# Patient Record
Sex: FEMALE | Race: WHITE | NOT HISPANIC OR LATINO | Employment: OTHER | URBAN - NONMETROPOLITAN AREA
[De-identification: names, ages, dates, MRNs, and addresses within clinical notes are randomized per-mention and may not be internally consistent; named-entity substitution may affect disease eponyms.]

---

## 2019-07-02 ENCOUNTER — OFFICE VISIT (OUTPATIENT)
Dept: URGENT CARE | Facility: PHYSICIAN GROUP | Age: 69
End: 2019-07-02
Payer: MEDICARE

## 2019-07-02 VITALS
HEART RATE: 96 BPM | TEMPERATURE: 97.9 F | SYSTOLIC BLOOD PRESSURE: 144 MMHG | RESPIRATION RATE: 16 BRPM | BODY MASS INDEX: 27.88 KG/M2 | WEIGHT: 142 LBS | HEIGHT: 60 IN | DIASTOLIC BLOOD PRESSURE: 82 MMHG | OXYGEN SATURATION: 96 %

## 2019-07-02 DIAGNOSIS — L03.012 CELLULITIS OF FINGER OF LEFT HAND: ICD-10-CM

## 2019-07-02 PROCEDURE — 99204 OFFICE O/P NEW MOD 45 MIN: CPT | Performed by: PHYSICIAN ASSISTANT

## 2019-07-02 RX ORDER — SULFAMETHOXAZOLE AND TRIMETHOPRIM 800; 160 MG/1; MG/1
1 TABLET ORAL 2 TIMES DAILY
Qty: 20 TAB | Refills: 0 | Status: SHIPPED | OUTPATIENT
Start: 2019-07-02 | End: 2019-07-12

## 2019-07-02 RX ORDER — DICLOFENAC SODIUM 75 MG/1
75 TABLET, DELAYED RELEASE ORAL 2 TIMES DAILY
Qty: 30 TAB | Refills: 0 | Status: SHIPPED | OUTPATIENT
Start: 2019-07-02

## 2019-07-03 NOTE — PROGRESS NOTES
Chief Complaint   Patient presents with   • Spider Bite       HISTORY OF PRESENT ILLNESS: Patient is a 68 y.o. female who presents today because she woke up this morning with her left ring finger swollen and red and tender.  She does remember gardening in her yard yesterday and not wearing gloves.  She does not remember getting bit or stung by an insect or spider but thinks that is what caused it.    Patient Active Problem List    Diagnosis Date Noted   • Hiatal hernia 07/30/2016   • Chest pain 07/30/2016   • Tobacco abuse 07/29/2016   • Depression 07/29/2016   • COPD (chronic obstructive pulmonary disease) ([facility]) 07/29/2016   • Chronic back pain 07/29/2016   • Rash 01/14/2013   • Cellulitis 01/14/2013       Allergies:No known drug allergy    Current Outpatient Prescriptions Ordered in [facility]   Medication Sig Dispense Refill   • diclofenac EC (VOLTAREN) 75 MG Tablet Delayed Response Take 1 Tab by mouth 2 times a day. 30 Tab 0   • sulfamethoxazole-trimethoprim (BACTRIM DS) 800-160 MG tablet Take 1 Tab by mouth 2 times a day for 10 days. 20 Tab 0   • atorvastatin (LIPITOR) 40 MG Tab Take 0.5 Tabs by mouth every bedtime. 30 Tab 2   • omeprazole (PRILOSEC) 20 MG delayed-release capsule Take 1 Cap by mouth every 12 hours. 30 Cap 11   • duloxetine (CYMBALTA) 60 MG Cap DR Particles delayed-release capsule Take 60 mg by mouth every day.     • gabapentin (NEURONTIN) 600 MG tablet Take 800 mg by mouth 3 times a day.     • ipratropium-albuterol (DUONEB) 0.5-2.5 (3) MG/3ML nebulizer solution 3 mL by Nebulization route 4 times a day.     • alprazolam (XANAX) 0.5 MG TABS Take 0.5 mg by mouth at bedtime as needed.     • aspirin (ASA) 325 MG TABS Take 325 mg by mouth Once.       No current Epic-ordered facility-administered medications on file.        Past Medical History:   Diagnosis Date   • Bell's palsy    • COPD    • Infectious disease     Hepatitis C   • Psychiatric problem     depression       Social History   Substance Use  Topics   • Smoking status: Current Every Day Smoker     Packs/day: 0.50     Types: Cigarettes   • Smokeless tobacco: Never Used   • Alcohol use No       No family status information on file.   No family history on file.    ROS:  Review of Systems   Constitutional: Negative for fever, chills, weight loss and malaise/fatigue.   HENT: Negative for ear pain, nosebleeds, congestion, sore throat and neck pain.    Eyes: Negative for blurred vision.   Respiratory: Negative for cough, sputum production, shortness of breath and wheezing.    Cardiovascular: Negative for chest pain, palpitations, orthopnea and leg swelling.   Gastrointestinal: Negative for heartburn, nausea, vomiting and abdominal pain.   Genitourinary: Negative for dysuria, urgency and frequency.     Exam:  /82 (BP Location: Right arm, Patient Position: Sitting, BP Cuff Size: Adult)   Pulse 96   Temp 36.6 °C (97.9 °F) (Temporal)   Resp 16   Ht 1.524 m (5')   Wt 64.4 kg (142 lb)   SpO2 96%   General:  Well nourished, well developed female in NAD  Head:Normocephalic, atraumatic  Eyes: PERRLA, EOM within normal limits, no conjunctival injection, no scleral icterus, visual fields and acuity grossly intact.  Nose: Symmetrical without tenderness, no discharge.  Mouth: reasonable hygiene, no erythema exudates or tonsillar enlargement.  Neck: no masses, range of motion within normal limits, no tracheal deviation. No obvious thyroid enlargement.  Extremities: no clubbing, cyanosis, or edema.  Left index finger is circumferentially swollen and erythematous and tender.  She has reduced range of motion secondary to pain.  No visible puncture wounds    Please note that this dictation was created using voice recognition software. I have made every reasonable attempt to correct obvious errors, but I expect that there are errors of grammar and possibly content that I did not discover before finalizing the note.    Assessment/Plan:  1. Cellulitis of finger of left  hand  diclofenac EC (VOLTAREN) 75 MG Tablet Delayed Response    sulfamethoxazole-trimethoprim (BACTRIM DS) 800-160 MG tablet       Followup with primary care in the next 7-10 days if not significantly improving, return to the urgent care or go to the emergency room sooner for any worsening of symptoms.

## 2021-07-01 PROBLEM — R52 INTRACTABLE PAIN: Status: ACTIVE | Noted: 2021-01-01

## 2021-07-01 PROBLEM — M75.122 NONTRAUMATIC COMPLETE TEAR OF LEFT ROTATOR CUFF: Status: ACTIVE | Noted: 2021-01-01

## 2021-07-01 PROBLEM — M50.30 OTHER CERVICAL DISC DEGENERATION, UNSPECIFIED CERVICAL REGION: Status: ACTIVE | Noted: 2021-01-01

## 2021-07-01 PROBLEM — M47.816 LUMBAR SPONDYLOSIS: Status: ACTIVE | Noted: 2021-01-01

## 2021-07-01 PROBLEM — F11.90 OPIOID USE: Status: ACTIVE | Noted: 2021-01-01

## 2021-07-01 PROBLEM — M79.18 CERVICAL MYOFASCIAL PAIN SYNDROME: Status: ACTIVE | Noted: 2021-01-01

## 2021-07-01 PROBLEM — M51.36 OTHER INTERVERTEBRAL DISC DEGENERATION, LUMBAR REGION: Status: ACTIVE | Noted: 2021-01-01

## 2022-01-01 ENCOUNTER — APPOINTMENT (OUTPATIENT)
Dept: URGENT CARE | Facility: PHYSICIAN GROUP | Age: 72
End: 2022-01-01
Payer: MEDICARE

## 2022-01-01 ENCOUNTER — HOSPITAL ENCOUNTER (OUTPATIENT)
Dept: RADIOLOGY | Facility: MEDICAL CENTER | Age: 72
End: 2022-06-29

## 2022-01-01 ENCOUNTER — APPOINTMENT (OUTPATIENT)
Dept: CARDIOLOGY | Facility: MEDICAL CENTER | Age: 72
DRG: 871 | End: 2022-01-01
Attending: INTERNAL MEDICINE
Payer: MEDICARE

## 2022-01-01 ENCOUNTER — APPOINTMENT (OUTPATIENT)
Dept: RADIOLOGY | Facility: MEDICAL CENTER | Age: 72
DRG: 871 | End: 2022-01-01
Attending: PSYCHIATRY & NEUROLOGY
Payer: MEDICARE

## 2022-01-01 ENCOUNTER — APPOINTMENT (OUTPATIENT)
Dept: RADIOLOGY | Facility: MEDICAL CENTER | Age: 72
DRG: 871 | End: 2022-01-01
Attending: INTERNAL MEDICINE
Payer: MEDICARE

## 2022-01-01 ENCOUNTER — HOSPITAL ENCOUNTER (INPATIENT)
Facility: MEDICAL CENTER | Age: 72
LOS: 3 days | DRG: 871 | End: 2022-07-01
Attending: INTERNAL MEDICINE | Admitting: INTERNAL MEDICINE
Payer: MEDICARE

## 2022-01-01 VITALS
TEMPERATURE: 98.2 F | RESPIRATION RATE: 20 BRPM | DIASTOLIC BLOOD PRESSURE: 51 MMHG | HEART RATE: 75 BPM | OXYGEN SATURATION: 81 % | HEIGHT: 61 IN | SYSTOLIC BLOOD PRESSURE: 93 MMHG | WEIGHT: 121.25 LBS | BODY MASS INDEX: 22.89 KG/M2

## 2022-01-01 LAB
ABO GROUP BLD: NORMAL
ALBUMIN SERPL BCP-MCNC: 2.1 G/DL (ref 3.2–4.9)
ALBUMIN SERPL BCP-MCNC: 2.4 G/DL (ref 3.2–4.9)
ALBUMIN SERPL BCP-MCNC: 2.6 G/DL (ref 3.2–4.9)
ALBUMIN SERPL BCP-MCNC: 2.6 G/DL (ref 3.2–4.9)
ALBUMIN/GLOB SERPL: 0.9 G/DL
ALBUMIN/GLOB SERPL: 1.1 G/DL
ALBUMIN/GLOB SERPL: 1.1 G/DL
ALBUMIN/GLOB SERPL: 1.2 G/DL
ALP SERPL-CCNC: 116 U/L (ref 30–99)
ALP SERPL-CCNC: 41 U/L (ref 30–99)
ALP SERPL-CCNC: 52 U/L (ref 30–99)
ALP SERPL-CCNC: 62 U/L (ref 30–99)
ALT SERPL-CCNC: 2424 U/L (ref 2–50)
ALT SERPL-CCNC: 3051 U/L (ref 2–50)
ALT SERPL-CCNC: 46 U/L (ref 2–50)
ALT SERPL-CCNC: 705 U/L (ref 2–50)
AMMONIA PLAS-SCNC: 55 UMOL/L (ref 11–45)
AMPHET UR QL SCN: NEGATIVE
ANION GAP SERPL CALC-SCNC: 12 MMOL/L (ref 7–16)
ANION GAP SERPL CALC-SCNC: 17 MMOL/L (ref 7–16)
ANION GAP SERPL CALC-SCNC: 18 MMOL/L (ref 7–16)
ANION GAP SERPL CALC-SCNC: 20 MMOL/L (ref 7–16)
ANION GAP SERPL CALC-SCNC: 21 MMOL/L (ref 7–16)
ANISOCYTOSIS BLD QL SMEAR: ABNORMAL
APTT PPP: 38.6 SEC (ref 24.7–36)
APTT PPP: 43.6 SEC (ref 24.7–36)
AST SERPL-CCNC: 161 U/L (ref 12–45)
AST SERPL-CCNC: 3462 U/L (ref 12–45)
AST SERPL-CCNC: 4392 U/L (ref 12–45)
AST SERPL-CCNC: 883 U/L (ref 12–45)
BACTERIA BLD CULT: ABNORMAL
BACTERIA CSF CULT: NORMAL
BARBITURATES UR QL SCN: NEGATIVE
BARCODED ABORH UBTYP: 6200
BARCODED PRD CODE UBPRD: NORMAL
BARCODED UNIT NUM UBUNT: NORMAL
BASE EXCESS BLDA CALC-SCNC: -1 MMOL/L (ref -4–3)
BASE EXCESS BLDA CALC-SCNC: -10 MMOL/L (ref -4–3)
BASE EXCESS BLDA CALC-SCNC: -6 MMOL/L (ref -4–3)
BASE EXCESS BLDA CALC-SCNC: -6 MMOL/L (ref -4–3)
BASOPHILS # BLD AUTO: 0 % (ref 0–1.8)
BASOPHILS # BLD: 0 K/UL (ref 0–0.12)
BENZODIAZ UR QL SCN: POSITIVE
BILIRUB SERPL-MCNC: 0.7 MG/DL (ref 0.1–1.5)
BILIRUB SERPL-MCNC: 0.8 MG/DL (ref 0.1–1.5)
BILIRUB SERPL-MCNC: 0.9 MG/DL (ref 0.1–1.5)
BILIRUB SERPL-MCNC: 1 MG/DL (ref 0.1–1.5)
BLOOD CULTURE HOLD CXBCH: NORMAL
BLOOD CULTURE HOLD CXBCH: NORMAL
BODY TEMPERATURE: ABNORMAL DEGREES
BREATHS SETTING VENT: 18
BREATHS SETTING VENT: 18
BREATHS SETTING VENT: 20
BREATHS SETTING VENT: 20
BUN SERPL-MCNC: 31 MG/DL (ref 8–22)
BUN SERPL-MCNC: 38 MG/DL (ref 8–22)
BUN SERPL-MCNC: 43 MG/DL (ref 8–22)
BUN SERPL-MCNC: 49 MG/DL (ref 8–22)
BUN SERPL-MCNC: 53 MG/DL (ref 8–22)
BURR CELLS BLD QL SMEAR: NORMAL
BURR CELLS/RBC NFR CSF MANUAL: 0 %
BZE UR QL SCN: NEGATIVE
C GATTII+NEOFOR DNA CSF QL NAA+NON-PROBE: NOT DETECTED
CA-I SERPL-SCNC: 0.9 MMOL/L (ref 1.1–1.3)
CALCIUM SERPL-MCNC: 6.8 MG/DL (ref 8.5–10.5)
CALCIUM SERPL-MCNC: 6.8 MG/DL (ref 8.5–10.5)
CALCIUM SERPL-MCNC: 7.2 MG/DL (ref 8.5–10.5)
CALCIUM SERPL-MCNC: 7.4 MG/DL (ref 8.5–10.5)
CALCIUM SERPL-MCNC: 7.6 MG/DL (ref 8.5–10.5)
CANNABINOIDS UR QL SCN: POSITIVE
CFT BLD TEG: 14.7 MIN (ref 4.6–9.1)
CFT P HPASE BLD TEG: 10 MIN (ref 4.3–8.3)
CHLORIDE SERPL-SCNC: 102 MMOL/L (ref 96–112)
CHLORIDE SERPL-SCNC: 103 MMOL/L (ref 96–112)
CHLORIDE SERPL-SCNC: 106 MMOL/L (ref 96–112)
CHLORIDE SERPL-SCNC: 107 MMOL/L (ref 96–112)
CHLORIDE SERPL-SCNC: 107 MMOL/L (ref 96–112)
CK SERPL-CCNC: 2510 U/L (ref 0–154)
CK SERPL-CCNC: 2890 U/L (ref 0–154)
CK SERPL-CCNC: 3083 U/L (ref 0–154)
CK SERPL-CCNC: 3377 U/L (ref 0–154)
CK SERPL-CCNC: 3449 U/L (ref 0–154)
CK SERPL-CCNC: 4548 U/L (ref 0–154)
CLARITY CSF: CLEAR
CLOT ANGLE BLD TEG: 64.4 DEGREES (ref 63–78)
CLOT LYSIS 30M P MA LENFR BLD TEG: 0.8 % (ref 0–2.6)
CMV DNA CSF QL NAA+NON-PROBE: NOT DETECTED
CO2 BLDA-SCNC: 16 MMOL/L (ref 20–33)
CO2 BLDA-SCNC: 20 MMOL/L (ref 20–33)
CO2 BLDA-SCNC: 20 MMOL/L (ref 20–33)
CO2 BLDA-SCNC: 26 MMOL/L (ref 20–33)
CO2 SERPL-SCNC: 15 MMOL/L (ref 20–33)
CO2 SERPL-SCNC: 17 MMOL/L (ref 20–33)
CO2 SERPL-SCNC: 17 MMOL/L (ref 20–33)
CO2 SERPL-SCNC: 18 MMOL/L (ref 20–33)
CO2 SERPL-SCNC: 20 MMOL/L (ref 20–33)
COLOR CSF: COLORLESS
COLOR SPUN CSF: COLORLESS
COMPONENT P 8504P: NORMAL
CORTIS SERPL-MCNC: 28.3 UG/DL (ref 0–23)
CREAT SERPL-MCNC: 1.11 MG/DL (ref 0.5–1.4)
CREAT SERPL-MCNC: 1.95 MG/DL (ref 0.5–1.4)
CREAT SERPL-MCNC: 2.17 MG/DL (ref 0.5–1.4)
CREAT SERPL-MCNC: 2.76 MG/DL (ref 0.5–1.4)
CREAT SERPL-MCNC: 2.84 MG/DL (ref 0.5–1.4)
CT.EXTRINSIC BLD ROTEM: 2.3 MIN (ref 0.8–2.1)
DELSYS IDSYS: ABNORMAL
DOHLE BOD BLD QL SMEAR: NORMAL
E COLI K1 DNA CSF QL NAA+NON-PROBE: NOT DETECTED
EKG IMPRESSION: NORMAL
EKG IMPRESSION: NORMAL
END TIDAL CARBON DIOXIDE IECO2: 26 MMHG
END TIDAL CARBON DIOXIDE IECO2: 27 MMHG
END TIDAL CARBON DIOXIDE IECO2: 29 MMHG
EOSINOPHIL # BLD AUTO: 0 K/UL (ref 0–0.51)
EOSINOPHIL NFR BLD: 0 % (ref 0–6.9)
ERYTHROCYTE [DISTWIDTH] IN BLOOD BY AUTOMATED COUNT: 51 FL (ref 35.9–50)
ERYTHROCYTE [DISTWIDTH] IN BLOOD BY AUTOMATED COUNT: 52.6 FL (ref 35.9–50)
ERYTHROCYTE [DISTWIDTH] IN BLOOD BY AUTOMATED COUNT: 53 FL (ref 35.9–50)
ERYTHROCYTE [DISTWIDTH] IN BLOOD BY AUTOMATED COUNT: 55 FL (ref 35.9–50)
EV RNA CSF QL NAA+NON-PROBE: NOT DETECTED
FIBRINOGEN PPP-MCNC: 325 MG/DL (ref 215–460)
FIBRINOGEN PPP-MCNC: 336 MG/DL (ref 215–460)
FIBRINOGEN PPP-MCNC: 390 MG/DL (ref 215–460)
FLUAV RNA SPEC QL NAA+PROBE: NEGATIVE
FLUBV RNA SPEC QL NAA+PROBE: NEGATIVE
GFR SERPLBLD CREATININE-BSD FMLA CKD-EPI: 17 ML/MIN/1.73 M 2
GFR SERPLBLD CREATININE-BSD FMLA CKD-EPI: 18 ML/MIN/1.73 M 2
GFR SERPLBLD CREATININE-BSD FMLA CKD-EPI: 24 ML/MIN/1.73 M 2
GFR SERPLBLD CREATININE-BSD FMLA CKD-EPI: 27 ML/MIN/1.73 M 2
GFR SERPLBLD CREATININE-BSD FMLA CKD-EPI: 53 ML/MIN/1.73 M 2
GLOBULIN SER CALC-MCNC: 2.2 G/DL (ref 1.9–3.5)
GLOBULIN SER CALC-MCNC: 2.2 G/DL (ref 1.9–3.5)
GLOBULIN SER CALC-MCNC: 2.3 G/DL (ref 1.9–3.5)
GLOBULIN SER CALC-MCNC: 2.4 G/DL (ref 1.9–3.5)
GLUCOSE CSF-MCNC: 68 MG/DL (ref 40–80)
GLUCOSE SERPL-MCNC: 117 MG/DL (ref 65–99)
GLUCOSE SERPL-MCNC: 132 MG/DL (ref 65–99)
GLUCOSE SERPL-MCNC: 164 MG/DL (ref 65–99)
GLUCOSE SERPL-MCNC: 177 MG/DL (ref 65–99)
GLUCOSE SERPL-MCNC: 80 MG/DL (ref 65–99)
GP B STREP DNA CSF QL NAA+NON-PROBE: NOT DETECTED
GRAM STN SPEC: NORMAL
GRAM STN SPEC: NORMAL
HAEM INFLU DNA CSF QL NAA+NON-PROBE: NOT DETECTED
HAV IGM SERPL QL IA: ABNORMAL
HBV CORE IGM SER QL: ABNORMAL
HBV SURFACE AG SER QL: ABNORMAL
HCO3 BLDA-SCNC: 14.9 MMOL/L (ref 17–25)
HCO3 BLDA-SCNC: 18.7 MMOL/L (ref 17–25)
HCO3 BLDA-SCNC: 19 MMOL/L (ref 17–25)
HCO3 BLDA-SCNC: 24.4 MMOL/L (ref 17–25)
HCT VFR BLD AUTO: 32.4 % (ref 37–47)
HCT VFR BLD AUTO: 33.9 % (ref 37–47)
HCT VFR BLD AUTO: 34.6 % (ref 37–47)
HCT VFR BLD AUTO: 39.3 % (ref 37–47)
HCV AB SER QL: REACTIVE
HCV RNA SERPL NAA+PROBE-ACNC: NOT DETECTED IU/ML
HCV RNA SERPL NAA+PROBE-LOG IU: NOT DETECTED LOG IU/ML
HCV RNA SERPL QL NAA+PROBE: NOT DETECTED
HGB BLD-MCNC: 10.8 G/DL (ref 12–16)
HGB BLD-MCNC: 11.4 G/DL (ref 12–16)
HGB BLD-MCNC: 11.7 G/DL (ref 12–16)
HGB BLD-MCNC: 12.6 G/DL (ref 12–16)
HHV6 DNA CSF QL NAA+NON-PROBE: NOT DETECTED
HIV 1+2 AB+HIV1 P24 AG SERPL QL IA: NORMAL
HOROWITZ INDEX BLDA+IHG-RTO: 257 MM[HG]
HOROWITZ INDEX BLDA+IHG-RTO: 317 MM[HG]
HOROWITZ INDEX BLDA+IHG-RTO: 343 MM[HG]
HOROWITZ INDEX BLDA+IHG-RTO: 360 MM[HG]
HSV DNA SPEC QL NAA+PROBE: NOT DETECTED
HSV1 DNA CSF QL NAA+NON-PROBE: NOT DETECTED
HSV2 DNA CSF QL NAA+NON-PROBE: NOT DETECTED
INR PPP: 1.7 (ref 0.87–1.13)
INR PPP: 2.01 (ref 0.87–1.13)
INR PPP: 2.49 (ref 0.87–1.13)
L MONOCYTOG DNA CSF QL NAA+NON-PROBE: NOT DETECTED
LACTATE BLD-SCNC: 2.2 MMOL/L (ref 0.5–2)
LACTATE BLD-SCNC: 4.2 MMOL/L (ref 0.5–2)
LACTATE BLD-SCNC: 4.9 MMOL/L (ref 0.5–2)
LACTATE BLD-SCNC: 6.2 MMOL/L (ref 0.5–2)
LACTATE BLD-SCNC: 6.3 MMOL/L (ref 0.5–2)
LACTATE BLD-SCNC: 7 MMOL/L (ref 0.5–2)
LACTATE BLD-SCNC: 7.2 MMOL/L (ref 0.5–2)
LACTATE SERPL-SCNC: 4.5 MMOL/L (ref 0.5–2)
LG PLATELETS BLD QL SMEAR: NORMAL
LV EJECT FRACT  99904: 35
LV EJECT FRACT MOD 2C 99903: 36.92
LV EJECT FRACT MOD 4C 99902: 30.79
LV EJECT FRACT MOD BP 99901: 35.43
LYMPHOCYTES # BLD AUTO: 0.12 K/UL (ref 1–4.8)
LYMPHOCYTES # BLD AUTO: 0.25 K/UL (ref 1–4.8)
LYMPHOCYTES # BLD AUTO: 0.62 K/UL (ref 1–4.8)
LYMPHOCYTES # BLD AUTO: 0.76 K/UL (ref 1–4.8)
LYMPHOCYTES NFR BLD: 1.7 % (ref 22–41)
LYMPHOCYTES NFR BLD: 11.2 % (ref 22–41)
LYMPHOCYTES NFR BLD: 11.3 % (ref 22–41)
LYMPHOCYTES NFR BLD: 5.3 % (ref 22–41)
LYMPHOCYTES NFR CSF: 1 %
MACROCYTES BLD QL SMEAR: ABNORMAL
MAGNESIUM SERPL-MCNC: 1.5 MG/DL (ref 1.5–2.5)
MAGNESIUM SERPL-MCNC: 2.3 MG/DL (ref 1.5–2.5)
MAGNESIUM SERPL-MCNC: 2.8 MG/DL (ref 1.5–2.5)
MANUAL DIFF BLD: ABNORMAL
MANUAL DIFF BLD: NORMAL
MCF BLD TEG: 51.9 MM (ref 52–69)
MCF.PLATELET INHIB BLD ROTEM: 19.6 MM (ref 15–32)
MCH RBC QN AUTO: 30.8 PG (ref 27–33)
MCH RBC QN AUTO: 31.1 PG (ref 27–33)
MCH RBC QN AUTO: 31.3 PG (ref 27–33)
MCH RBC QN AUTO: 31.5 PG (ref 27–33)
MCHC RBC AUTO-ENTMCNC: 32.1 G/DL (ref 33.6–35)
MCHC RBC AUTO-ENTMCNC: 33.3 G/DL (ref 33.6–35)
MCHC RBC AUTO-ENTMCNC: 33.6 G/DL (ref 33.6–35)
MCHC RBC AUTO-ENTMCNC: 33.8 G/DL (ref 33.6–35)
MCV RBC AUTO: 91.6 FL (ref 81.4–97.8)
MCV RBC AUTO: 93 FL (ref 81.4–97.8)
MCV RBC AUTO: 93.4 FL (ref 81.4–97.8)
MCV RBC AUTO: 97.8 FL (ref 81.4–97.8)
METAMYELOCYTES NFR BLD MANUAL: 0.9 %
METAMYELOCYTES NFR BLD MANUAL: 10.5 %
METAMYELOCYTES NFR BLD MANUAL: 6.1 %
METHADONE UR QL SCN: NEGATIVE
MODE IMODE: ABNORMAL
MONOCYTES # BLD AUTO: 0.04 K/UL (ref 0–0.85)
MONOCYTES # BLD AUTO: 0.05 K/UL (ref 0–0.85)
MONOCYTES # BLD AUTO: 0.12 K/UL (ref 0–0.85)
MONOCYTES # BLD AUTO: 0.41 K/UL (ref 0–0.85)
MONOCYTES NFR BLD AUTO: 0.9 % (ref 0–13.4)
MONOCYTES NFR BLD AUTO: 0.9 % (ref 0–13.4)
MONOCYTES NFR BLD AUTO: 1.7 % (ref 0–13.4)
MONOCYTES NFR BLD AUTO: 6 % (ref 0–13.4)
MONONUC CELLS NFR CSF: 3 %
MORPHOLOGY BLD-IMP: NORMAL
MYELOCYTES NFR BLD MANUAL: 0.9 %
MYELOCYTES NFR BLD MANUAL: 0.9 %
MYELOCYTES NFR BLD MANUAL: 1.8 %
N MEN DNA CSF QL NAA+NON-PROBE: NOT DETECTED
NEUTROPHILS # BLD AUTO: 3.83 K/UL (ref 2–7.15)
NEUTROPHILS # BLD AUTO: 4.83 K/UL (ref 2–7.15)
NEUTROPHILS # BLD AUTO: 5.51 K/UL (ref 2–7.15)
NEUTROPHILS # BLD AUTO: 6.54 K/UL (ref 2–7.15)
NEUTROPHILS NFR BLD: 59.6 % (ref 44–72)
NEUTROPHILS NFR BLD: 64.3 % (ref 44–72)
NEUTROPHILS NFR BLD: 68.7 % (ref 44–72)
NEUTROPHILS NFR BLD: 74.1 % (ref 44–72)
NEUTROPHILS NFR CSF: 96 %
NEUTS BAND NFR BLD MANUAL: 20.9 % (ref 0–10)
NEUTS BAND NFR BLD MANUAL: 21.9 % (ref 0–10)
NEUTS BAND NFR BLD MANUAL: 23.5 % (ref 0–10)
NEUTS BAND NFR BLD MANUAL: 6.9 % (ref 0–10)
NRBC # BLD AUTO: 0 K/UL
NRBC BLD-RTO: 0 /100 WBC
O2/TOTAL GAS SETTING VFR VENT: 30 %
O2/TOTAL GAS SETTING VFR VENT: 60 %
OPIATES UR QL SCN: NEGATIVE
OVALOCYTES BLD QL SMEAR: NORMAL
OXYCODONE UR QL SCN: NEGATIVE
PA AA BLD-ACNC: 0 % (ref 0–11)
PA ADP BLD-ACNC: 11.3 % (ref 0–17)
PARECHOVIRUS A RNA CSF QL NAA+NON-PROBE: NOT DETECTED
PCO2 BLDA: 27.8 MMHG (ref 26–37)
PCO2 BLDA: 33 MMHG (ref 26–37)
PCO2 BLDA: 33.5 MMHG (ref 26–37)
PCO2 BLDA: 42.7 MMHG (ref 26–37)
PCO2 TEMP ADJ BLDA: 30.2 MMHG (ref 26–37)
PCO2 TEMP ADJ BLDA: 32.3 MMHG (ref 26–37)
PCO2 TEMP ADJ BLDA: 33.4 MMHG (ref 26–37)
PCO2 TEMP ADJ BLDA: 41.6 MMHG (ref 26–37)
PCP UR QL SCN: NEGATIVE
PEEP END EXPIRATORY PRESSURE IPEEP: 8 CMH20
PH BLDA: 7.34 [PH] (ref 7.4–7.5)
PH BLDA: 7.36 [PH] (ref 7.4–7.5)
PH BLDA: 7.36 [PH] (ref 7.4–7.5)
PH BLDA: 7.37 [PH] (ref 7.4–7.5)
PH TEMP ADJ BLDA: 7.31 [PH] (ref 7.4–7.5)
PH TEMP ADJ BLDA: 7.36 [PH] (ref 7.4–7.5)
PH TEMP ADJ BLDA: 7.37 [PH] (ref 7.4–7.5)
PH TEMP ADJ BLDA: 7.38 [PH] (ref 7.4–7.5)
PHOSPHATE SERPL-MCNC: 4.8 MG/DL (ref 2.5–4.5)
PHOSPHATE SERPL-MCNC: 5.7 MG/DL (ref 2.5–4.5)
PHOSPHATE SERPL-MCNC: 5.7 MG/DL (ref 2.5–4.5)
PLATELET # BLD AUTO: 59 K/UL (ref 164–446)
PLATELET # BLD AUTO: 74 K/UL (ref 164–446)
PLATELET # BLD AUTO: 80 K/UL (ref 164–446)
PLATELET # BLD AUTO: 95 K/UL (ref 164–446)
PLATELET BLD QL SMEAR: NORMAL
PMV BLD AUTO: 12.1 FL (ref 9–12.9)
PMV BLD AUTO: 12.9 FL (ref 9–12.9)
PMV BLD AUTO: 13 FL (ref 9–12.9)
PMV BLD AUTO: 13.2 FL (ref 9–12.9)
PO2 BLDA: 103 MMHG (ref 64–87)
PO2 BLDA: 216 MMHG (ref 64–87)
PO2 BLDA: 77 MMHG (ref 64–87)
PO2 BLDA: 95 MMHG (ref 64–87)
PO2 TEMP ADJ BLDA: 100 MMHG (ref 64–87)
PO2 TEMP ADJ BLDA: 106 MMHG (ref 64–87)
PO2 TEMP ADJ BLDA: 213 MMHG (ref 64–87)
PO2 TEMP ADJ BLDA: 76 MMHG (ref 64–87)
POIKILOCYTOSIS BLD QL SMEAR: NORMAL
POTASSIUM SERPL-SCNC: 3.1 MMOL/L (ref 3.6–5.5)
POTASSIUM SERPL-SCNC: 4.4 MMOL/L (ref 3.6–5.5)
POTASSIUM SERPL-SCNC: 4.5 MMOL/L (ref 3.6–5.5)
POTASSIUM SERPL-SCNC: 4.6 MMOL/L (ref 3.6–5.5)
POTASSIUM SERPL-SCNC: 5.2 MMOL/L (ref 3.6–5.5)
PRODUCT TYPE UPROD: NORMAL
PROPOXYPH UR QL SCN: NEGATIVE
PROT CSF-MCNC: 59 MG/DL (ref 15–45)
PROT SERPL-MCNC: 4.5 G/DL (ref 6–8.2)
PROT SERPL-MCNC: 4.6 G/DL (ref 6–8.2)
PROT SERPL-MCNC: 4.8 G/DL (ref 6–8.2)
PROT SERPL-MCNC: 4.9 G/DL (ref 6–8.2)
PROTHROMBIN TIME: 19.5 SEC (ref 12–14.6)
PROTHROMBIN TIME: 22.2 SEC (ref 12–14.6)
PROTHROMBIN TIME: 26.2 SEC (ref 12–14.6)
RBC # BLD AUTO: 3.47 M/UL (ref 4.2–5.4)
RBC # BLD AUTO: 3.7 M/UL (ref 4.2–5.4)
RBC # BLD AUTO: 3.72 M/UL (ref 4.2–5.4)
RBC # BLD AUTO: 4.02 M/UL (ref 4.2–5.4)
RBC # CSF: 201 CELLS/UL
RBC BLD AUTO: PRESENT
RH BLD: NORMAL
RPR SER QL: NON REACTIVE
RSV RNA SPEC QL NAA+PROBE: NEGATIVE
S PNEUM DNA CSF QL NAA+NON-PROBE: NOT DETECTED
SAO2 % BLDA: 100 % (ref 93–99)
SAO2 % BLDA: 95 % (ref 93–99)
SAO2 % BLDA: 97 % (ref 93–99)
SAO2 % BLDA: 98 % (ref 93–99)
SARS-COV-2 RNA RESP QL NAA+PROBE: NOTDETECTED
SIGNIFICANT IND 70042: ABNORMAL
SIGNIFICANT IND 70042: ABNORMAL
SIGNIFICANT IND 70042: NORMAL
SIGNIFICANT IND 70042: NORMAL
SITE SITE: ABNORMAL
SITE SITE: ABNORMAL
SITE SITE: NORMAL
SITE SITE: NORMAL
SODIUM SERPL-SCNC: 139 MMOL/L (ref 135–145)
SODIUM SERPL-SCNC: 140 MMOL/L (ref 135–145)
SODIUM SERPL-SCNC: 141 MMOL/L (ref 135–145)
SOURCE SOURCE: ABNORMAL
SOURCE SOURCE: ABNORMAL
SOURCE SOURCE: NORMAL
SOURCE SOURCE: NORMAL
SPECIMEN DRAWN FROM PATIENT: ABNORMAL
SPECIMEN SOURCE: NORMAL
SPECIMEN SOURCE: NORMAL
SPECIMEN VOL CSF: 9 ML
SPHEROCYTES BLD QL SMEAR: NORMAL
T PALLIDUM AB SER QL IA: REACTIVE
TEG ALGORITHM TGALG: ABNORMAL
TIDAL VOLUME IVT: 290 ML
TIDAL VOLUME IVT: 290 ML
TIDAL VOLUME IVT: 340 ML
TIDAL VOLUME IVT: 340 ML
TRIGL SERPL-MCNC: 81 MG/DL (ref 0–149)
TROPONIN T SERPL-MCNC: 2845 NG/L (ref 6–19)
TROPONIN T SERPL-MCNC: 4051 NG/L (ref 6–19)
TROPONIN T SERPL-MCNC: 4130 NG/L (ref 6–19)
TSH SERPL DL<=0.005 MIU/L-ACNC: 4.3 UIU/ML (ref 0.38–5.33)
TUBE # CSF: 3
TUBE # CSF: 4
UNIT STATUS USTAT: NORMAL
VANCOMYCIN SERPL-MCNC: 23.1 UG/ML
VIT B12 SERPL-MCNC: >4000 PG/ML (ref 211–911)
VZV DNA CSF QL NAA+NON-PROBE: NOT DETECTED
WBC # BLD AUTO: 4.7 K/UL (ref 4.8–10.8)
WBC # BLD AUTO: 5.5 K/UL (ref 4.8–10.8)
WBC # BLD AUTO: 6.8 K/UL (ref 4.8–10.8)
WBC # BLD AUTO: 7.3 K/UL (ref 4.8–10.8)
WBC # CSF: 88 CELLS/UL (ref 0–10)

## 2022-01-01 PROCEDURE — 700105 HCHG RX REV CODE 258: Performed by: STUDENT IN AN ORGANIZED HEALTH CARE EDUCATION/TRAINING PROGRAM

## 2022-01-01 PROCEDURE — 700111 HCHG RX REV CODE 636 W/ 250 OVERRIDE (IP): Performed by: NURSE PRACTITIONER

## 2022-01-01 PROCEDURE — 62270 DX LMBR SPI PNXR: CPT

## 2022-01-01 PROCEDURE — 85384 FIBRINOGEN ACTIVITY: CPT

## 2022-01-01 PROCEDURE — 85025 COMPLETE CBC W/AUTO DIFF WBC: CPT

## 2022-01-01 PROCEDURE — 700101 HCHG RX REV CODE 250: Performed by: INTERNAL MEDICINE

## 2022-01-01 PROCEDURE — 80202 ASSAY OF VANCOMYCIN: CPT

## 2022-01-01 PROCEDURE — C9803 HOPD COVID-19 SPEC COLLECT: HCPCS | Performed by: NURSE PRACTITIONER

## 2022-01-01 PROCEDURE — 700117 HCHG RX CONTRAST REV CODE 255: Performed by: PSYCHIATRY & NEUROLOGY

## 2022-01-01 PROCEDURE — 84484 ASSAY OF TROPONIN QUANT: CPT | Mod: 91

## 2022-01-01 PROCEDURE — 700105 HCHG RX REV CODE 258: Performed by: INTERNAL MEDICINE

## 2022-01-01 PROCEDURE — 99291 CRITICAL CARE FIRST HOUR: CPT | Performed by: INTERNAL MEDICINE

## 2022-01-01 PROCEDURE — 0241U HCHG SARS-COV-2 COVID-19 NFCT DS RESP RNA 4 TRGT MIC: CPT

## 2022-01-01 PROCEDURE — 84100 ASSAY OF PHOSPHORUS: CPT

## 2022-01-01 PROCEDURE — 82330 ASSAY OF CALCIUM: CPT

## 2022-01-01 PROCEDURE — 82945 GLUCOSE OTHER FLUID: CPT

## 2022-01-01 PROCEDURE — 94003 VENT MGMT INPAT SUBQ DAY: CPT

## 2022-01-01 PROCEDURE — 83735 ASSAY OF MAGNESIUM: CPT

## 2022-01-01 PROCEDURE — 87147 CULTURE TYPE IMMUNOLOGIC: CPT

## 2022-01-01 PROCEDURE — 99223 1ST HOSP IP/OBS HIGH 75: CPT | Performed by: INTERNAL MEDICINE

## 2022-01-01 PROCEDURE — 700111 HCHG RX REV CODE 636 W/ 250 OVERRIDE (IP): Performed by: INTERNAL MEDICINE

## 2022-01-01 PROCEDURE — 83605 ASSAY OF LACTIC ACID: CPT

## 2022-01-01 PROCEDURE — 700102 HCHG RX REV CODE 250 W/ 637 OVERRIDE(OP): Performed by: STUDENT IN AN ORGANIZED HEALTH CARE EDUCATION/TRAINING PROGRAM

## 2022-01-01 PROCEDURE — 82550 ASSAY OF CK (CPK): CPT

## 2022-01-01 PROCEDURE — 36556 INSERT NON-TUNNEL CV CATH: CPT | Mod: RT | Performed by: INTERNAL MEDICINE

## 2022-01-01 PROCEDURE — 85730 THROMBOPLASTIN TIME PARTIAL: CPT

## 2022-01-01 PROCEDURE — 84157 ASSAY OF PROTEIN OTHER: CPT

## 2022-01-01 PROCEDURE — 86900 BLOOD TYPING SEROLOGIC ABO: CPT

## 2022-01-01 PROCEDURE — A9576 INJ PROHANCE MULTIPACK: HCPCS | Performed by: PSYCHIATRY & NEUROLOGY

## 2022-01-01 PROCEDURE — 82550 ASSAY OF CK (CPK): CPT | Mod: 91

## 2022-01-01 PROCEDURE — 86592 SYPHILIS TEST NON-TREP QUAL: CPT

## 2022-01-01 PROCEDURE — 700105 HCHG RX REV CODE 258: Performed by: NURSE PRACTITIONER

## 2022-01-01 PROCEDURE — 99223 1ST HOSP IP/OBS HIGH 75: CPT | Performed by: PSYCHIATRY & NEUROLOGY

## 2022-01-01 PROCEDURE — 87040 BLOOD CULTURE FOR BACTERIA: CPT

## 2022-01-01 PROCEDURE — 99291 CRITICAL CARE FIRST HOUR: CPT | Mod: 25,GC | Performed by: INTERNAL MEDICINE

## 2022-01-01 PROCEDURE — 80053 COMPREHEN METABOLIC PANEL: CPT

## 2022-01-01 PROCEDURE — 85610 PROTHROMBIN TIME: CPT

## 2022-01-01 PROCEDURE — 700105 HCHG RX REV CODE 258: Performed by: PSYCHIATRY & NEUROLOGY

## 2022-01-01 PROCEDURE — 93306 TTE W/DOPPLER COMPLETE: CPT

## 2022-01-01 PROCEDURE — 85007 BL SMEAR W/DIFF WBC COUNT: CPT

## 2022-01-01 PROCEDURE — 84484 ASSAY OF TROPONIN QUANT: CPT

## 2022-01-01 PROCEDURE — 99292 CRITICAL CARE ADDL 30 MIN: CPT | Performed by: NURSE PRACTITIONER

## 2022-01-01 PROCEDURE — 770022 HCHG ROOM/CARE - ICU (200)

## 2022-01-01 PROCEDURE — 05HY33Z INSERTION OF INFUSION DEVICE INTO UPPER VEIN, PERCUTANEOUS APPROACH: ICD-10-PCS | Performed by: INTERNAL MEDICINE

## 2022-01-01 PROCEDURE — 82533 TOTAL CORTISOL: CPT

## 2022-01-01 PROCEDURE — 87205 SMEAR GRAM STAIN: CPT

## 2022-01-01 PROCEDURE — 93010 ELECTROCARDIOGRAM REPORT: CPT | Performed by: INTERNAL MEDICINE

## 2022-01-01 PROCEDURE — 85576 BLOOD PLATELET AGGREGATION: CPT | Mod: 91

## 2022-01-01 PROCEDURE — 93005 ELECTROCARDIOGRAM TRACING: CPT | Performed by: STUDENT IN AN ORGANIZED HEALTH CARE EDUCATION/TRAINING PROGRAM

## 2022-01-01 PROCEDURE — 82803 BLOOD GASES ANY COMBINATION: CPT

## 2022-01-01 PROCEDURE — 80307 DRUG TEST PRSMV CHEM ANLYZR: CPT

## 2022-01-01 PROCEDURE — 86901 BLOOD TYPING SEROLOGIC RH(D): CPT

## 2022-01-01 PROCEDURE — 84478 ASSAY OF TRIGLYCERIDES: CPT

## 2022-01-01 PROCEDURE — C1751 CATH, INF, PER/CENT/MIDLINE: HCPCS

## 2022-01-01 PROCEDURE — 99497 ADVNCD CARE PLAN 30 MIN: CPT | Performed by: NURSE PRACTITIONER

## 2022-01-01 PROCEDURE — 99292 CRITICAL CARE ADDL 30 MIN: CPT | Performed by: INTERNAL MEDICINE

## 2022-01-01 PROCEDURE — 03HY32Z INSERTION OF MONITORING DEVICE INTO UPPER ARTERY, PERCUTANEOUS APPROACH: ICD-10-PCS | Performed by: INTERNAL MEDICINE

## 2022-01-01 PROCEDURE — 84443 ASSAY THYROID STIM HORMONE: CPT

## 2022-01-01 PROCEDURE — 87186 SC STD MICRODIL/AGAR DIL: CPT

## 2022-01-01 PROCEDURE — 700111 HCHG RX REV CODE 636 W/ 250 OVERRIDE (IP): Performed by: PSYCHIATRY & NEUROLOGY

## 2022-01-01 PROCEDURE — 700101 HCHG RX REV CODE 250: Performed by: PSYCHIATRY & NEUROLOGY

## 2022-01-01 PROCEDURE — 37799 UNLISTED PX VASCULAR SURGERY: CPT

## 2022-01-01 PROCEDURE — 86780 TREPONEMA PALLIDUM: CPT

## 2022-01-01 PROCEDURE — 82607 VITAMIN B-12: CPT

## 2022-01-01 PROCEDURE — 71045 X-RAY EXAM CHEST 1 VIEW: CPT

## 2022-01-01 PROCEDURE — P9034 PLATELETS, PHERESIS: HCPCS

## 2022-01-01 PROCEDURE — 99233 SBSQ HOSP IP/OBS HIGH 50: CPT | Performed by: PSYCHIATRY & NEUROLOGY

## 2022-01-01 PROCEDURE — 94799 UNLISTED PULMONARY SVC/PX: CPT

## 2022-01-01 PROCEDURE — 36620 INSERTION CATHETER ARTERY: CPT | Performed by: INTERNAL MEDICINE

## 2022-01-01 PROCEDURE — 87529 HSV DNA AMP PROBE: CPT

## 2022-01-01 PROCEDURE — G0475 HIV COMBINATION ASSAY: HCPCS

## 2022-01-01 PROCEDURE — 87522 HEPATITIS C REVRS TRNSCRPJ: CPT

## 2022-01-01 PROCEDURE — 87150 DNA/RNA AMPLIFIED PROBE: CPT

## 2022-01-01 PROCEDURE — 700101 HCHG RX REV CODE 250: Performed by: NURSE PRACTITIONER

## 2022-01-01 PROCEDURE — 82803 BLOOD GASES ANY COMBINATION: CPT | Mod: 91

## 2022-01-01 PROCEDURE — 89051 BODY FLUID CELL COUNT: CPT

## 2022-01-01 PROCEDURE — 82140 ASSAY OF AMMONIA: CPT

## 2022-01-01 PROCEDURE — 700111 HCHG RX REV CODE 636 W/ 250 OVERRIDE (IP): Performed by: STUDENT IN AN ORGANIZED HEALTH CARE EDUCATION/TRAINING PROGRAM

## 2022-01-01 PROCEDURE — C1755 CATHETER, INTRASPINAL: HCPCS

## 2022-01-01 PROCEDURE — 36620 INSERTION CATHETER ARTERY: CPT

## 2022-01-01 PROCEDURE — 76775 US EXAM ABDO BACK WALL LIM: CPT

## 2022-01-01 PROCEDURE — 87070 CULTURE OTHR SPECIMN AEROBIC: CPT

## 2022-01-01 PROCEDURE — 87483 CNS DNA AMP PROBE TYPE 12-25: CPT

## 2022-01-01 PROCEDURE — A9270 NON-COVERED ITEM OR SERVICE: HCPCS | Performed by: STUDENT IN AN ORGANIZED HEALTH CARE EDUCATION/TRAINING PROGRAM

## 2022-01-01 PROCEDURE — 80053 COMPREHEN METABOLIC PANEL: CPT | Mod: 91

## 2022-01-01 PROCEDURE — 36430 TRANSFUSION BLD/BLD COMPNT: CPT

## 2022-01-01 PROCEDURE — 36556 INSERT NON-TUNNEL CV CATH: CPT

## 2022-01-01 PROCEDURE — 87077 CULTURE AEROBIC IDENTIFY: CPT

## 2022-01-01 PROCEDURE — 5A1945Z RESPIRATORY VENTILATION, 24-96 CONSECUTIVE HOURS: ICD-10-PCS | Performed by: INTERNAL MEDICINE

## 2022-01-01 PROCEDURE — 94002 VENT MGMT INPAT INIT DAY: CPT

## 2022-01-01 PROCEDURE — 99233 SBSQ HOSP IP/OBS HIGH 50: CPT | Performed by: INTERNAL MEDICINE

## 2022-01-01 PROCEDURE — 70553 MRI BRAIN STEM W/O & W/DYE: CPT | Mod: MG

## 2022-01-01 PROCEDURE — 93306 TTE W/DOPPLER COMPLETE: CPT | Mod: 26 | Performed by: INTERNAL MEDICINE

## 2022-01-01 PROCEDURE — 80048 BASIC METABOLIC PNL TOTAL CA: CPT

## 2022-01-01 PROCEDURE — 80074 ACUTE HEPATITIS PANEL: CPT

## 2022-01-01 PROCEDURE — 30233R1 TRANSFUSION OF NONAUTOLOGOUS PLATELETS INTO PERIPHERAL VEIN, PERCUTANEOUS APPROACH: ICD-10-PCS | Performed by: INTERNAL MEDICINE

## 2022-01-01 PROCEDURE — 70544 MR ANGIOGRAPHY HEAD W/O DYE: CPT | Mod: ME

## 2022-01-01 PROCEDURE — 99292 CRITICAL CARE ADDL 30 MIN: CPT | Mod: 25,GC | Performed by: INTERNAL MEDICINE

## 2022-01-01 PROCEDURE — 99498 ADVNCD CARE PLAN ADDL 30 MIN: CPT | Performed by: NURSE PRACTITIONER

## 2022-01-01 PROCEDURE — 62270 DX LMBR SPI PNXR: CPT | Performed by: INTERNAL MEDICINE

## 2022-01-01 PROCEDURE — 93010 ELECTROCARDIOGRAM REPORT: CPT | Mod: 77 | Performed by: INTERNAL MEDICINE

## 2022-01-01 PROCEDURE — 85347 COAGULATION TIME ACTIVATED: CPT

## 2022-01-01 RX ORDER — POLYETHYLENE GLYCOL 3350 17 G/17G
1 POWDER, FOR SOLUTION ORAL
Status: DISCONTINUED | OUTPATIENT
Start: 2022-01-01 | End: 2022-01-01

## 2022-01-01 RX ORDER — POTASSIUM CHLORIDE 29.8 MG/ML
40 INJECTION INTRAVENOUS ONCE
Status: COMPLETED | OUTPATIENT
Start: 2022-01-01 | End: 2022-01-01

## 2022-01-01 RX ORDER — SODIUM BICARBONATE IN D5W 150/1000ML
PLASTIC BAG, INJECTION (ML) INTRAVENOUS CONTINUOUS
Status: DISCONTINUED | OUTPATIENT
Start: 2022-01-01 | End: 2022-01-01

## 2022-01-01 RX ORDER — ACETAMINOPHEN 325 MG/1
650 TABLET ORAL EVERY 6 HOURS PRN
Status: DISCONTINUED | OUTPATIENT
Start: 2022-01-01 | End: 2022-01-01

## 2022-01-01 RX ORDER — LORAZEPAM 2 MG/ML
1 INJECTION INTRAMUSCULAR
Status: DISCONTINUED | OUTPATIENT
Start: 2022-01-01 | End: 2022-01-01 | Stop reason: HOSPADM

## 2022-01-01 RX ORDER — FAMOTIDINE 20 MG/1
20 TABLET, FILM COATED ORAL DAILY
Status: DISCONTINUED | OUTPATIENT
Start: 2022-01-01 | End: 2022-01-01

## 2022-01-01 RX ORDER — LORAZEPAM 2 MG/ML
1 CONCENTRATE ORAL
Status: DISCONTINUED | OUTPATIENT
Start: 2022-01-01 | End: 2022-01-01 | Stop reason: HOSPADM

## 2022-01-01 RX ORDER — ACETAMINOPHEN 650 MG/1
650 SUPPOSITORY RECTAL EVERY 4 HOURS PRN
Status: DISCONTINUED | OUTPATIENT
Start: 2022-01-01 | End: 2022-01-01 | Stop reason: HOSPADM

## 2022-01-01 RX ORDER — SODIUM CHLORIDE, SODIUM LACTATE, POTASSIUM CHLORIDE, CALCIUM CHLORIDE 600; 310; 30; 20 MG/100ML; MG/100ML; MG/100ML; MG/100ML
INJECTION, SOLUTION INTRAVENOUS CONTINUOUS
Status: DISCONTINUED | OUTPATIENT
Start: 2022-01-01 | End: 2022-01-01

## 2022-01-01 RX ORDER — ONDANSETRON 2 MG/ML
8 INJECTION INTRAMUSCULAR; INTRAVENOUS EVERY 8 HOURS PRN
Status: DISCONTINUED | OUTPATIENT
Start: 2022-01-01 | End: 2022-01-01 | Stop reason: HOSPADM

## 2022-01-01 RX ORDER — ACETAMINOPHEN 325 MG/1
650 TABLET ORAL EVERY 4 HOURS PRN
Status: DISCONTINUED | OUTPATIENT
Start: 2022-01-01 | End: 2022-01-01 | Stop reason: HOSPADM

## 2022-01-01 RX ORDER — SODIUM CHLORIDE, SODIUM LACTATE, POTASSIUM CHLORIDE, AND CALCIUM CHLORIDE .6; .31; .03; .02 G/100ML; G/100ML; G/100ML; G/100ML
1000 INJECTION, SOLUTION INTRAVENOUS ONCE
Status: COMPLETED | OUTPATIENT
Start: 2022-01-01 | End: 2022-01-01

## 2022-01-01 RX ORDER — ATROPINE SULFATE 10 MG/ML
2 SOLUTION/ DROPS OPHTHALMIC EVERY 4 HOURS PRN
Status: DISCONTINUED | OUTPATIENT
Start: 2022-01-01 | End: 2022-01-01 | Stop reason: HOSPADM

## 2022-01-01 RX ORDER — MAGNESIUM SULFATE HEPTAHYDRATE 40 MG/ML
2 INJECTION, SOLUTION INTRAVENOUS ONCE
Status: COMPLETED | OUTPATIENT
Start: 2022-01-01 | End: 2022-01-01

## 2022-01-01 RX ORDER — NOREPINEPHRINE BITARTRATE 0.03 MG/ML
0-30 INJECTION, SOLUTION INTRAVENOUS CONTINUOUS
Status: DISCONTINUED | OUTPATIENT
Start: 2022-01-01 | End: 2022-01-01

## 2022-01-01 RX ORDER — ONDANSETRON 4 MG/1
8 TABLET, ORALLY DISINTEGRATING ORAL EVERY 8 HOURS PRN
Status: DISCONTINUED | OUTPATIENT
Start: 2022-01-01 | End: 2022-01-01 | Stop reason: HOSPADM

## 2022-01-01 RX ORDER — BISACODYL 10 MG
10 SUPPOSITORY, RECTAL RECTAL
Status: DISCONTINUED | OUTPATIENT
Start: 2022-01-01 | End: 2022-01-01

## 2022-01-01 RX ORDER — AMOXICILLIN 250 MG
2 CAPSULE ORAL 2 TIMES DAILY
Status: DISCONTINUED | OUTPATIENT
Start: 2022-01-01 | End: 2022-01-01

## 2022-01-01 RX ORDER — LEVETIRACETAM 500 MG/5ML
1000 INJECTION, SOLUTION, CONCENTRATE INTRAVENOUS EVERY 12 HOURS
Status: DISCONTINUED | OUTPATIENT
Start: 2022-01-01 | End: 2022-01-01

## 2022-01-01 RX ORDER — POTASSIUM CHLORIDE 7.45 MG/ML
10 INJECTION INTRAVENOUS
Status: DISCONTINUED | OUTPATIENT
Start: 2022-01-01 | End: 2022-01-01

## 2022-01-01 RX ORDER — ACETAMINOPHEN 650 MG/1
650 SUPPOSITORY RECTAL EVERY 4 HOURS PRN
Status: DISCONTINUED | OUTPATIENT
Start: 2022-01-01 | End: 2022-01-01

## 2022-01-01 RX ORDER — DEXMEDETOMIDINE HYDROCHLORIDE 4 UG/ML
.1-1.5 INJECTION INTRAVENOUS CONTINUOUS
Status: DISCONTINUED | OUTPATIENT
Start: 2022-01-01 | End: 2022-01-01

## 2022-01-01 RX ADMIN — CEFTRIAXONE SODIUM 2 G: 10 INJECTION, POWDER, FOR SOLUTION INTRAVENOUS at 05:40

## 2022-01-01 RX ADMIN — NOREPINEPHRINE BITARTRATE 15 MCG/MIN: 1 INJECTION, SOLUTION, CONCENTRATE INTRAVENOUS at 19:45

## 2022-01-01 RX ADMIN — LEVETIRACETAM 1000 MG: 100 INJECTION, SOLUTION INTRAVENOUS at 17:40

## 2022-01-01 RX ADMIN — AMPICILLIN 2000 MG: 2 INJECTION, POWDER, FOR SOLUTION INTRAVENOUS at 00:00

## 2022-01-01 RX ADMIN — ACYCLOVIR SODIUM 478 MG: 50 INJECTION, SOLUTION INTRAVENOUS at 00:13

## 2022-01-01 RX ADMIN — LORAZEPAM 1 MG: 2 INJECTION INTRAMUSCULAR; INTRAVENOUS at 18:41

## 2022-01-01 RX ADMIN — VANCOMYCIN HYDROCHLORIDE 750 MG: 500 INJECTION, POWDER, LYOPHILIZED, FOR SOLUTION INTRAVENOUS at 01:24

## 2022-01-01 RX ADMIN — SODIUM BICARBONATE: 84 INJECTION, SOLUTION INTRAVENOUS at 08:40

## 2022-01-01 RX ADMIN — ACETAMINOPHEN 650 MG: 650 SUPPOSITORY RECTAL at 04:16

## 2022-01-01 RX ADMIN — NOREPINEPHRINE BITARTRATE 25 MCG/MIN: 1 INJECTION, SOLUTION, CONCENTRATE INTRAVENOUS at 02:31

## 2022-01-01 RX ADMIN — MAGNESIUM SULFATE HEPTAHYDRATE 2 G: 40 INJECTION, SOLUTION INTRAVENOUS at 00:20

## 2022-01-01 RX ADMIN — NOREPINEPHRINE BITARTRATE 10 MCG/MIN: 1 INJECTION, SOLUTION, CONCENTRATE INTRAVENOUS at 07:17

## 2022-01-01 RX ADMIN — FAMOTIDINE 20 MG: 10 INJECTION INTRAVENOUS at 04:36

## 2022-01-01 RX ADMIN — CEFTRIAXONE SODIUM 2 G: 10 INJECTION, POWDER, FOR SOLUTION INTRAVENOUS at 05:18

## 2022-01-01 RX ADMIN — SODIUM BICARBONATE 100 MEQ: 84 INJECTION, SOLUTION INTRAVENOUS at 08:18

## 2022-01-01 RX ADMIN — NOREPINEPHRINE BITARTRATE 6 MCG/MIN: 1 INJECTION, SOLUTION, CONCENTRATE INTRAVENOUS at 14:53

## 2022-01-01 RX ADMIN — VASOPRESSIN 0.03 UNITS/MIN: 20 INJECTION, SOLUTION INTRAMUSCULAR; SUBCUTANEOUS at 07:17

## 2022-01-01 RX ADMIN — SODIUM BICARBONATE: 84 INJECTION, SOLUTION INTRAVENOUS at 11:50

## 2022-01-01 RX ADMIN — CEFTRIAXONE SODIUM 2 G: 10 INJECTION, POWDER, FOR SOLUTION INTRAVENOUS at 17:40

## 2022-01-01 RX ADMIN — CALCIUM CHLORIDE 1000 MG: 100 INJECTION, SOLUTION INTRAVENOUS at 07:19

## 2022-01-01 RX ADMIN — DEXMEDETOMIDINE 0.2 MCG/KG/HR: 200 INJECTION, SOLUTION INTRAVENOUS at 08:18

## 2022-01-01 RX ADMIN — VANCOMYCIN HYDROCHLORIDE 1500 MG: 500 INJECTION, POWDER, LYOPHILIZED, FOR SOLUTION INTRAVENOUS at 21:57

## 2022-01-01 RX ADMIN — FENTANYL CITRATE 100 MCG: 0.05 INJECTION, SOLUTION INTRAMUSCULAR; INTRAVENOUS at 01:38

## 2022-01-01 RX ADMIN — SODIUM CHLORIDE, POTASSIUM CHLORIDE, SODIUM LACTATE AND CALCIUM CHLORIDE 1000 ML: 600; 310; 30; 20 INJECTION, SOLUTION INTRAVENOUS at 12:49

## 2022-01-01 RX ADMIN — VASOPRESSIN 0.03 UNITS/MIN: 20 INJECTION, SOLUTION INTRAMUSCULAR; SUBCUTANEOUS at 20:40

## 2022-01-01 RX ADMIN — POTASSIUM CHLORIDE 40 MEQ: 29.8 INJECTION, SOLUTION INTRAVENOUS at 22:33

## 2022-01-01 RX ADMIN — SODIUM CHLORIDE, POTASSIUM CHLORIDE, SODIUM LACTATE AND CALCIUM CHLORIDE: 600; 310; 30; 20 INJECTION, SOLUTION INTRAVENOUS at 23:15

## 2022-01-01 RX ADMIN — SODIUM CHLORIDE, POTASSIUM CHLORIDE, SODIUM LACTATE AND CALCIUM CHLORIDE 1000 ML: 600; 310; 30; 20 INJECTION, SOLUTION INTRAVENOUS at 19:11

## 2022-01-01 RX ADMIN — VASOPRESSIN 0.03 UNITS/MIN: 20 INJECTION, SOLUTION INTRAMUSCULAR; SUBCUTANEOUS at 07:40

## 2022-01-01 RX ADMIN — AMPICILLIN 2000 MG: 2 INJECTION, POWDER, FOR SOLUTION INTRAVENOUS at 16:49

## 2022-01-01 RX ADMIN — LORAZEPAM 1 MG: 2 INJECTION INTRAMUSCULAR; INTRAVENOUS at 19:23

## 2022-01-01 RX ADMIN — FAMOTIDINE 20 MG: 10 INJECTION INTRAVENOUS at 05:40

## 2022-01-01 RX ADMIN — AMPICILLIN 2000 MG: 2 INJECTION, POWDER, FOR SOLUTION INTRAVENOUS at 05:18

## 2022-01-01 RX ADMIN — ACETAMINOPHEN 650 MG: 650 SUPPOSITORY RECTAL at 00:00

## 2022-01-01 RX ADMIN — VASOPRESSIN 0.03 UNITS/MIN: 20 INJECTION, SOLUTION INTRAMUSCULAR; SUBCUTANEOUS at 22:32

## 2022-01-01 RX ADMIN — AMPICILLIN 2000 MG: 2 INJECTION, POWDER, FOR SOLUTION INTRAVENOUS at 12:48

## 2022-01-01 RX ADMIN — GADOTERIDOL 10 ML: 279.3 INJECTION, SOLUTION INTRAVENOUS at 21:24

## 2022-01-01 RX ADMIN — VASOPRESSIN 0.03 UNITS/MIN: 20 INJECTION, SOLUTION INTRAMUSCULAR; SUBCUTANEOUS at 17:28

## 2022-01-01 RX ADMIN — CALCIUM CHLORIDE 1000 MG: 100 INJECTION, SOLUTION INTRAVENOUS at 10:52

## 2022-01-01 RX ADMIN — DEXMEDETOMIDINE 0.4 MCG/KG/HR: 200 INJECTION, SOLUTION INTRAVENOUS at 02:26

## 2022-01-01 RX ADMIN — PROPOFOL 10 MCG/KG/MIN: 10 INJECTION, EMULSION INTRAVENOUS at 20:15

## 2022-01-01 RX ADMIN — MORPHINE SULFATE 10 MG: 10 INJECTION INTRAVENOUS at 18:39

## 2022-01-01 RX ADMIN — AMPICILLIN 2000 MG: 2 INJECTION, POWDER, FOR SOLUTION INTRAVENOUS at 00:46

## 2022-01-01 RX ADMIN — ACYCLOVIR SODIUM 478 MG: 50 INJECTION, SOLUTION INTRAVENOUS at 05:40

## 2022-01-01 RX ADMIN — LORAZEPAM 1 MG: 2 INJECTION INTRAMUSCULAR; INTRAVENOUS at 01:28

## 2022-01-01 RX ADMIN — SODIUM BICARBONATE: 84 INJECTION, SOLUTION INTRAVENOUS at 21:41

## 2022-01-01 RX ADMIN — ACETAMINOPHEN 650 MG: 650 SUPPOSITORY RECTAL at 08:18

## 2022-01-01 RX ADMIN — LEVETIRACETAM 1000 MG: 100 INJECTION, SOLUTION INTRAVENOUS at 05:18

## 2022-01-01 RX ADMIN — PROPOFOL 20 MCG/KG/MIN: 10 INJECTION, EMULSION INTRAVENOUS at 07:39

## 2022-01-01 RX ADMIN — AMPICILLIN 2000 MG: 2 INJECTION, POWDER, FOR SOLUTION INTRAVENOUS at 05:40

## 2022-01-01 RX ADMIN — LEVETIRACETAM 1000 MG: 100 INJECTION, SOLUTION INTRAVENOUS at 05:40

## 2022-01-01 RX ADMIN — LEVETIRACETAM 1000 MG: 100 INJECTION, SOLUTION INTRAVENOUS at 20:00

## 2022-01-01 RX ADMIN — ACYCLOVIR SODIUM 478 MG: 50 INJECTION, SOLUTION INTRAVENOUS at 05:17

## 2022-01-01 ASSESSMENT — FIBROSIS 4 INDEX: FIB4 SCORE: 67.47

## 2022-01-01 ASSESSMENT — ENCOUNTER SYMPTOMS: FEVER: 0

## 2022-06-28 PROBLEM — R79.89 ELEVATED TROPONIN: Status: ACTIVE | Noted: 2022-01-01

## 2022-06-28 PROBLEM — R57.9 SHOCK (HCC): Status: ACTIVE | Noted: 2022-01-01

## 2022-06-28 PROBLEM — A41.9 SEPTIC SHOCK (HCC): Status: ACTIVE | Noted: 2022-01-01

## 2022-06-28 PROBLEM — J96.01 ACUTE RESPIRATORY FAILURE WITH HYPOXIA (HCC): Status: ACTIVE | Noted: 2022-01-01

## 2022-06-28 PROBLEM — I61.9 INTRAPARENCHYMAL HEMORRHAGE OF BRAIN (HCC): Status: ACTIVE | Noted: 2022-01-01

## 2022-06-28 PROBLEM — R65.21 SEPTIC SHOCK (HCC): Status: ACTIVE | Noted: 2022-01-01

## 2022-06-29 PROBLEM — G89.4 CHRONIC PAIN SYNDROME: Status: ACTIVE | Noted: 2021-01-01

## 2022-06-29 PROBLEM — R74.01 ELEVATED TRANSAMINASE LEVEL: Status: ACTIVE | Noted: 2022-01-01

## 2022-06-29 PROBLEM — D69.6 THROMBOCYTOPENIA (HCC): Status: ACTIVE | Noted: 2022-01-01

## 2022-06-29 PROBLEM — Z79.899 CHRONIC PRESCRIPTION BENZODIAZEPINE USE: Chronic | Status: ACTIVE | Noted: 2022-01-01

## 2022-06-29 PROBLEM — Z79.899 CHRONIC PRESCRIPTION BENZODIAZEPINE USE: Status: ACTIVE | Noted: 2022-01-01

## 2022-06-29 NOTE — CONSULTS
Neurology Initial Consult H&P  Neurohospitalist Service, [facility]    Referring Physician: Dimas Bonds M.D.    Chief complaint:  Altered, obtunded    HPI: Candelaria Murphy is a 71 year-old woman with no known vascular risk factors, presenting to OS after being found down.  The patient is currently intubated and sedated, and cannot provide any history.  Per chart review- patient was last seen well yesterday afternoon, and was subsequently found down on the floor, obtunded by her spouse this afternoon.   She was taken to [facility], where GCS was reportedly 6, and she was urgently intubated.  She was noted to be hypotensive tos systolic in 80s, febrile to 38.9.  A head CT revealed a relatively small R frontal intraparenchymal hemorrhage, and bilateral parietal, cortically-based SAH.  There was extensive white matter disease.  She was given a keppra 20mg/kg load, as well as multiple fluid boluses and a dose of ceftriaxone.  Additionally, she was noted to have troponin elevation, leukocytosis and a lactic acid of 3.  A CT chest, abdomen, pelvis reportedly without acute pathology.  En route, patient received fentanyl, versed, but had further decline in neurologic status.  She also became more hypotensive requiring vasopressor support.  Unable to attain ROS at this time (patient is comatose)    Review of systems: In addition to what is detailed in the HPI above, all other systems reviewed and are negative.    Past Medical History:    has a past medical history of Bell's palsy, COPD, Infectious disease, and Psychiatric problem.    FHx:  Unable to be attained, patient is comatose    SHx:   reports that she has been smoking cigarettes. She has been smoking about 0.50 packs per day. She has never used smokeless tobacco. She reports that she does not drink alcohol and does not use drugs.    Allergies:  Allergies   Allergen Reactions   • No Known Drug Allergy        Medications:  No  current facility-administered medications for this encounter.    Physical Examination:     General: Patient is obtunded  Eye: Examination of optic disks not indicated at this time given acuity of consult  Neck: There is normal range of motion  CV: tachycardic  Extremities:  clear, dry, intact, cool to touch    NEUROLOGICAL EXAM:     Wt 57 kg (125 lb 10.6 oz)   BMI 24.54 kg/m²       Mental status: Obtunded, non-interactive  Speech and language: Intubated, not command following  Cranial nerve exam: PERRL, 3mm, midline.  There is no blink to threat, there is no tracking.  Face is symmetric  Motor exam: No movements in all extremities to noxious  Sensory exam: As above  Coordination: Cannot be tested, no movements  Gait:  Cannot be tested, no movement    [facility] documentation ICH    1. GCS: Eye: (no response 1) Motor: (no motor response 1) Verbal:(no verbal response 1) Total:  3  2.   Age: 71  3.   Infratentorial ICH:  No  4.   IVH present:  No  5.   ICH volume:  5 ml by ABC/2 method  6.   ICH score: 2   Time/Date completed: 715PM 6/28/2022  (GCS 3-4 2pts, 5-12 1pt, 13-15 0pt; Age > 80 1pt; Infra ICH 1pt; Volume > 30cc 1pt; IVH 1pt)  7.   Lobar hemorrhage:  Yes  8.   INR > 1.4: No  9.   If INR > 1.4, was procoagulant reversal agent given (if no, why not):  N/A      Objective Data:    Labs:  No results found for: PROTHROMBTM, INR   Lab Results   Component Value Date/Time    WBC 15.4 (H) 01/10/2009 04:30 AM    RBC 3.39 (L) 01/10/2009 04:30 AM    HEMOGLOBIN 11.2 (L) 01/10/2009 04:30 AM    HEMATOCRIT 31.7 (L) 01/10/2009 04:30 AM    MCV 93.7 01/10/2009 04:30 AM    MCH 33.0 01/10/2009 04:30 AM    MCHC 35.2 (H) 01/10/2009 04:30 AM    MPV 11.1 (H) 01/10/2009 04:30 AM    NEUTSPOLYS 76.0 (H) 01/10/2009 04:30 AM    LYMPHOCYTES 15.0 (L) 01/10/2009 04:30 AM    MONOCYTES 2.0 01/10/2009 04:30 AM    EOSINOPHILS 0.0 01/10/2009 04:30 AM    BASOPHILS 0.0 01/10/2009 04:30 AM      Lab Results   Component Value Date/Time    SODIUM 140  07/30/2016 04:15 AM    POTASSIUM 4.9 07/30/2016 04:15 AM    CHLORIDE 103 07/30/2016 04:15 AM    CO2 29 07/30/2016 04:15 AM    GLUCOSE 87 07/30/2016 04:15 AM    BUN 13 07/30/2016 04:15 AM    CREATININE 0.84 07/30/2016 04:15 AM    CREATININE 0.8 01/10/2009 04:30 AM      Lab Results   Component Value Date/Time    CHOLSTRLTOT 204 (H) 07/30/2016 04:15 AM     (H) 07/30/2016 04:15 AM    HDL 31 (A) 07/30/2016 04:15 AM    TRIGLYCERIDE 214 (H) 07/30/2016 04:15 AM       No results found for: ALKPHOSPHAT, ASTSGOT, ALTSGPT, TBILIRUBIN     Imaging/Testing:    I interpreted and/or reviewed the patient's neuroimaging    No orders to display       Assessment and Plan:  Candelaria Murphy is a 71 year-old woman presenting with obtundation, found to have multifocal brain hemorrhages, diffuse vasogenic edema.  In addition, she is noted to have shock physiology.  Unclear etiology of her brain hemorrhages- most concerning is angioinvasive infectious process such as HSV encephalitis- ICU to stabilize hemodynamically, attain LP for CSF studies and MRI imaging.  Additional etiologies to intracranial pathology includes inflammatory cerebral amyloid angiopathy, hemorrhagic metastatic disease, and less likely underlying vascular lesion.  However, these etiologies are not typically associated with concomitant shock.  Overall hemorrhage burden is low- without mass effect to warrant hyperosmolar therapy or surgical decompression.  No known underlying coagulopathy, however would check DIC panel given her critical nature.    Problem list:  1.  Multifocal brain hemorrhage, IPH and SAH  2.  Diffuse cerebral edema  3.  Shock physiology    Plan:   - q2h neurochecks   - MRI ICH protocol (MRI brain w/wo contrast, MRA head wo)   - LP for CSF analysis:  Cell count and diff, protein, glucose, cultures, HSV PCR, meningoencephalitis panel   - recommend empiric meningitis and HSV coverage with vancomycin, ceftriaxone, ampicillin, and acyclovir   -  continue keppra 1000mg IV q12h   - normal Na goal 135-145, no mass effect to warrant hyperosmolar therapy   - avoid all antithrombotic therapy, SCDs only, INR goal < 1.5, platelet goal > 100k   - not a candidate for PT/OT/SLP    Upon my evaluation, this patient had a high probability of imminent or life-threatening deterioration due to intracranial hemorrhage which required my direct attention, intervention, and personal management.  I personally provided 60 minutes of total critical care time outside of time spent on separately billable/documented procedures. Time includes: review of laboratory data, review of radiology studies, discussion with consultants, discussion with family/patient, monitoring for potential decompensation.  Interventions were performed as documented in the chart.    The evaluation of the patient, and recommended management, was discussed with the ICU staff.     Jovanni Connelly MD  Neurohospitalist, Acute Care Services

## 2022-06-29 NOTE — ASSESSMENT & PLAN NOTE
-Suspect shock liver from sepsis and hypotension  -Avoid hepatotoxins  -Hepatitis C reactive -ID following

## 2022-06-29 NOTE — DISCHARGE PLANNING
Case Management Discharge Planning    Admission Date: 6/28/2022  GMLOS:    ALOS: 1    6-Clicks ADL Score:    6-Clicks Mobility Score:        Anticipated Discharge Dispo:  Pending PT recommendation    DME Needed: No, pending PT recommendation    Action(s) Taken: Updated Provider/Nurse on Discharge Plan MD requested CM call patient PCP and request labs, notes, visit records from Nicolas Youngblood MD in [place], [place]  CM spoke to KALEY Hawkins and records were faxed to CM. CM took fax to Charge nurse Lucita to scan into the chart. [id number] RN also notified that records had been faxed and scanned into chart.    Escalations Completed: Bedside RN    Medically Clear: No    Next Steps: Critical level of care. CM will continue to assist with discharge planning and barriers.    Barriers to Discharge: Medical clearance, Pending Placement, Pending PT Evaluation, Pending Insurance Authorization and DME    Is the patient up for discharge tomorrow: No

## 2022-06-29 NOTE — ASSESSMENT & PLAN NOTE
-Intubated date: 6/28/22  -Titrate ventilator prescription to maintain acid-base balance, oxygenation and ventilation  -Titrate FiO2 to keep sats > 92%  -ABCDEF bundle  -Pepcid, chlorhexidine, SCD  -HOB > 30

## 2022-06-29 NOTE — EEG PROGRESS NOTE
[facility] Critical Care Medicine Progress Note    Brief HPI/problem list:  71-year-old female, reportedly on ASA only with no known medical history.  Her  reported she was last seen well this morning in her normal state of health.  He went to work and when he came home he found her on the floor in the home and difficult to arouse.  GCS was 6 on arrival.    She reportedly had a rectal temperature of 38.9 and was intubated in the ED for airway protection.  CT showed a 1.5 cm intraparenchymal hemorrhage in the right frontal lobe by report.  There was trace subarachnoid hemorrhage and a distant location.  On arrival she reportedly was nonverbal would open her eyes to painful stimulus only and not follow any commands.  She was given ceftriaxone, Keppra, 2 L IV fluid.  Cultures were obtained.  Abnormal labs include a white blood cell count of 18,000, troponin markedly elevated at 11,000 (normal less than 20), no ST change on EKG, lactic acid was 3, CT chest/abdomen/pelvis without contrast was reportedly unremarkable.  Urinalysis was pending.  Blood cultures were obtained and transfer request to higher level of care with neurology and neurosurgical support. On arrival, she was hypotensive and has been started on norepinephrine.  I placed central venous catheter, arterial catheter and perform longer puncture.  She received additional crystalloid and vasopressin was added. (Dr Bonds HPI 6/28)    6/28 admit,  Found down, ICH, intubated, elevated LA 3, hypotensive -> NE, LP  6/29 VD#2, NE10, vaso 0.03, MOSF, Abnormal MRI, Lp fluid c/w infectious/inflammatory process all initial micro studies/PCR negative, ECHO pending    Daily exam:  Sedate/unresponsive on vent, no meningeal signs, brainstem reflexes ok, tachypnea, clear lungs, B9 abdomen    Daily data review: CSF PCR panel negative, does have WBC 88 with 96% polys, protein 59, glucose 68, RBCs 201, colorless/clear.  A 5.2, bicarb 15, BUN 38, creatinine 1.95, , ALT  705, bili 1.0, alk phos 52, glucose 80, CPK 2510, Trop 4K, coags elevated, fibrinogen normal, echocardiogram interpretation pending, significant AS per tech?    Case reviewed with  at bedside.  Patient has not felt well for several weeks and has seen her primary care doctor several times.  She was okay Monday morning but came home from visiting a friend not feeling well vomited and went to bed.  Next day she was still sleeping and he went to work but when he came home he found her facedown on the floor and called the paramedics.  He thought she did get up and let the dog out sometime during the day.    Case reviewed with neurology at length, he is concerned of Hurst syndrome    ID eval appreciated, continue current antibiotic regimen    Reviewed echocardiogram & EKG with cardiology, moderate AS and EF 30-35% with global decreased function and no focal wall motion abnormalities and min NSST-T changes on EKG.  Both agreed probable demand ischemia and poor candidate for cath lab - monitor/holdon cards eval    TEAM Rounds:  Neuro: Not following, Prop -> DEX & Fent pops  HR: SR 80s  SBP: 90-120s, NE  25 -> 10 vasopressin 0.03  Tmax: 102.7, WBC 7.3  GI:  NPO  UOP: Low  Lines: ET, CVC, arterial line, Lowery  Resp: V#2, 18 290 8   Vte: SCDs, contraindicated with CNS bleed  PPI/H2: Pepcid  Antibx: Ceftriaxone/ampicillin/acyclovir/vancomycin    Assessment:  Multifocal ICH/SAH  Encephalitis/meningitis?  Broad differential reviewed with neurology  Sepsis with shock, cardiac/neurogenic component?  Acute respiratory failure, intubated in part to protect airway  RADHA  Elevated LFTs  Elevated CPK  AGMA  Acute encephalopathy  Elevated troponin  Thrombocytopenia, acute on chronic?  Coagulopathy with normal fibrinogen  History of valvular heart disease?  History of Bell's palsy  History of COPD, + TOB  History of DLD  Home PPI query GERD    Plan of care:  Actively titrating norepinephrine  Vasopressin infusion  Replete  Ca  Follow-up CPK & lactic acid  Serial CMP, coags-monitor hepatic synthetic function  Monitor glucose, support with dextrose?  Echocardiogram interpretation pending  Bicarb drip with D5, DC LR infusion  Bolus +2 Amps IV bicarb  Serial ABG/BMP  Fluid bolus 1L  Follow-up coags/TEG/fibrinogen, may need blood products  Attempt to keep platelet count above 50 K, transfuse  Keppra prophylaxis  IV antibiotics-cover CNS, hold Vanco with RADHA  ID consultation-Hep panel, RPR, HIV etc  No steroids for now, reviewed with Neuro  Further CNS w/u per Neuro  Change to home PPI  RT protocols  Nebulizer treatments Q4  SATs, not ready for SBT's  Avoid nephrotoxins, serial BMP, monitor UO  Renal ultrasound, monitor for need for RRT/nephrology consultation-next 24 hrs    Case reviewed with , ID, neurology, RCC APRN, RN, Charge RN, RT, Clinical pharmacist and Cardiology    Critical care time provided was 100 minutes. This excludes all separate billable procedures.     Please see [employer]/NP notes for additional documentation    Sidney Frost MD  RCC

## 2022-06-29 NOTE — H&P
Critical Care Consult      Date of Service: 6/28/2022    Requesting provider: Dimas Bonds M.D.    Chief Complaint: Intraparenchymal hemorrhage of brain (HCC)    History of Present Illness: Candelaria Murphy is a 71 y.o. female with reported PMH of COPD, HTN, GERD, back pack, anxiety/depression who presented to [facility] with altered mentation. Per documentation, last known normal was this morning by her , then found on the ground, altered, confused.  stated she was sick yesterday and found her face down around 3:15pm today.     GCS was 6 on arrival to outside ER. CT head showed 1.5 cm intraparenchymal hemorrhage in the right frontal lobe, imaging report not available in records. No labs available in packet other that CBC with WBC 18.4, Hgb 13.3, note says an RADHA was present (no labs to review). Vitals notable for tachycardia rate up to 110, SBP in 80s, lactate was 3, elevated troponin >11,000, no EKG changes Ct chest/abdomen/pelvis reportedly unremarkable. Patient was intubated before transfer. She was given a keppra load, 2L fluid bolus, C3 before transfer.     Patient arrived intubated, unable to obtain additional history.  Due to hypotension and shock on arrival, CVC and art line were placed. LP was also done at bedside.    I spoke to the  this evening and updated him on overall condition.  He said he will try to be here tomorrow and that daughter is flying from [place] and should be here tomorrow.  I also verified that CODE STATUS would be full code.    EKG: I reviewed the outside EKG from outside records.  Notable for sinus tachycardia rate 110, QTc 553.  No ST elevation.    Past Medical History:   has a past medical history of Bell's palsy, COPD, Infectious disease, and Psychiatric problem.    Past Surgical History:   has a past surgical history that includes lumbar laminectomy diskectomy (1/7/2009) and foraminotomy (1/7/2009).    Allergies:  No known drug  "allergy    Medications:  No current facility-administered medications on file prior to encounter.     Current Outpatient Medications on File Prior to Encounter   Medication Sig Dispense Refill   • diclofenac EC (VOLTAREN) 75 MG Tablet Delayed Response Take 1 Tab by mouth 2 times a day. 30 Tab 0   • atorvastatin (LIPITOR) 40 MG Tab Take 0.5 Tabs by mouth every bedtime. 30 Tab 2   • omeprazole (PRILOSEC) 20 MG delayed-release capsule Take 1 Cap by mouth every 12 hours. 30 Cap 11   • duloxetine (CYMBALTA) 60 MG Cap DR Particles delayed-release capsule Take 60 mg by mouth every day.     • gabapentin (NEURONTIN) 600 MG tablet Take 800 mg by mouth 3 times a day.     • ipratropium-albuterol (DUONEB) 0.5-2.5 (3) MG/3ML nebulizer solution 3 mL by Nebulization route 4 times a day.     • alprazolam (XANAX) 0.5 MG TABS Take 0.5 mg by mouth at bedtime as needed.     • aspirin (ASA) 325 MG TABS Take 325 mg by mouth Once.         Social History:   reports that she has been smoking cigarettes. She has been smoking about 0.50 packs per day. She has never used smokeless tobacco. She reports that she does not drink alcohol and does not use drugs.    Family History:  Brother had Hailey Gehrig's disease, mother had heart valve problem    Review of Systems   Unable to perform ROS: Intubated       Vitals:    06/28/22 1845 06/28/22 1900   Height: 1.549 m (5' 0.98\")    Weight:  57 kg (125 lb 10.6 oz)   Weight % change since last entry.:  0 %       Physical Examination:  Physical Exam  Constitutional:       Comments: intubated   HENT:      Head: Normocephalic and atraumatic.      Right Ear: External ear normal.      Left Ear: External ear normal.      Nose: Nose normal.      Mouth/Throat:      Mouth: Mucous membranes are moist.   Eyes:      Pupils: Pupils are equal, round, and reactive to light.   Cardiovascular:      Rate and Rhythm: Regular rhythm. Tachycardia present.      Pulses: Normal pulses.   Pulmonary:      Breath sounds: Normal breath " sounds.   Abdominal:      General: Abdomen is flat.      Palpations: Abdomen is soft.   Musculoskeletal:      Right lower leg: No edema.      Left lower leg: No edema.   Neurological:      Mental Status: She is lethargic and disoriented.      GCS: GCS eye subscore is 1. GCS motor subscore is 1.      Motor: Weakness present.      Comments: No movement to painful simuli in all 4 extremities, GCS E 1, V NT, M 1          Laboratories:      No results for input(s): SODIUM, POTASSIUM, CHLORIDE, CO2, BUN, CREATININE, MAGNESIUM, PHOSPHORUS, CALCIUM in the last 72 hours.  No results for input(s): ALTSGPT, ASTSGOT, ALKPHOSPHAT, TBILIRUBIN, DBILIRUBIN, GAMMAGT, AMYLASE, LIPASE, ALB, PREALBUMIN, GLUCOSE in the last 72 hours.      MR-BRAIN-WITH & W/O    (Results Pending)   MR-MRA HEAD-W/O    (Results Pending)       Assessment/Plan:  * Intraparenchymal hemorrhage of brain (HCC)  Assessment & Plan  Multifocal brain hemorrhages with IPH and SAH on imaging  Neurology Dr. Connelly consulted, appreciate recommendations  PLAN:  neuro checks q2 hours  Stat MRI brain ICH protocol  obtain CSF fluid - send for fluid cultures, cell count and diff, protein, glucose, HSV PCR, meningoencephalitis panel  Goal SBP <140, cardene drip prn  Goal Na 135-45  Keppra 1000mg IV q12h  Check basic labs, TEG, PT/INR, CPK      Septic shock (HCC)  Assessment & Plan  This is Sepsis Present on admission  SIRS criteria identified on my evaluation include: Fever, with temperature greater than 101 deg F, Tachycardia, with heart rate greater than 90 BPM and Leukocytosis, with WBC greater than 12,000  Source is unknown  Fluid resuscitation ordered per protocol  Crystalloid Fluid Administration: Fluid resuscitation ordered - Received 3.5 L total in fluids  IV antibiotics/antivirals as appropriate for source of sepsis, acyclovir, ampicillin, ceftriaxone and vancomycin  Sent for CSF cultures  Follow blood cultures drawn in outside ER  Reassessment: I have reassessed the  patient's hemodynamic status    Vasopressin, Levophed for goal MAP greater than 65  TTE ordered        Elevated troponin  Assessment & Plan  Reportedly elevated up to 11,000 according to outside ER report  -likely demand ischemia  Check troponin, trend if needed  EKG from outside ER shows no ST elevation    Acute respiratory failure with hypoxia (HCC)  Assessment & Plan  Intubated date: 6/28/22  Goal saturation > 92%  Monitor ventilator waveforms and titrate flow/peep and volumes according.   Lung protective ventilation strategy w/ A-F bundle  CXR: monitor lung volumes and tube/line placement  VAP bundle prevention, oral care, post pyloric feeding  Head of bed > 30 degree  GI prophylaxis  Daily awakening and SBT trials unless contraindicated  Monitor for liberation  Respiratory treatments: prn         CODE STATUS: Full

## 2022-06-29 NOTE — ASSESSMENT & PLAN NOTE
-PDMP reviewed  -Acetaminophen-Cod #3 Tablet as OP - holding due to elevated liver enzymes  -PRN Fentanyl

## 2022-06-29 NOTE — ASSESSMENT & PLAN NOTE
-per chart review, this appears to be chronic with baseline platelet count 110-130's  -monitor bleeding  -follow CBC

## 2022-06-29 NOTE — CARE PLAN
Ventilator Daily Summary    Vent Day #2    ETT 7.5 @ 22    Ventilator settings changed this shift:no    APVcmv  20 290 8 30%    Weaning trials:    Respiratory Procedures:    Plan: Continue current ventilator settings and wean mechanical ventilation as tolerated per physician orders.      [facility] - ICU 16   Critical Care Medicine  Progress Note    Patient Name: Nubia Riggs  MRN: [id number]  Admission Date: 10/18/2020  Hospital Length of Stay: 10 days  Code Status: Full Code  Attending Provider: Raad Martel MD  Primary Care Provider: Primary Doctor No   Principal Problem: Pneumonia due to COVID-19 virus    Subjective:     HPI:  53-year-old male with PMhx of GERD, allergic rinhitis, chronic cough (recently saw pulm and was diagnosed with reactive airway disease, on albuterol PRN) presented the emergency department on 10/18 with chief complaint of shortness of breath. He reports subjective fever, headache, chills, myalgias, poor appetite, and sweats about 6 days ago. He was diagnosed with COVID-19 on 10/15.  Reports presenting to urgent care 10/16 due to 89% pulse ox on room air. Started on levofloxacin and discharged home.  Reports using albuterol treatments at home without much relief.He noticed to have cough productive of blood tinged sputum and shortness of breath so he presented to the ED.     He received ceftria/azithro (x1 each in the ED). He started Remdesivir and completed 5 doses on 10/22, continues on dexamethasone. Patient is on his 7th day of hospital admission and has been requiring higher doses of O2 to keep sats >90%. Today he went up to 60L on 100% FiO2 and thus Critical Care was consulted.       Hospital/ICU Course:  Patient admitted to RICU due to worsening respiratory status with increasing oxygen requirements. Completion of remdesivir on 10/22. On BiPAP with O2 saturation in high 70s-low 80s at rest and acute desaturation to 40s when biPAP removed to provide PO medications. He required intubation on 10/26. Central line and arterial line placed. Proned on 10/27. Blood cultures sent and worsening renal function noted on am labs.     Interval History/Significant Events: proned overnight, sCr plateau'd, UO remains adequate. Oxygenation appears to improve  imensely when prone and progress is lost upon supination.     Review of Systems   Unable to perform ROS: Intubated     Objective:     Vital Signs (Most Recent):  Temp: 98.9 °F (37.2 °C) (10/28/20 1645)  Pulse: 80 (10/28/20 1845)  Resp: (!) 35 (10/28/20 1845)  BP: (!) 100/56 (10/28/20 1000)  SpO2: (!) 91 % (10/28/20 1845) Vital Signs (24h Range):  Temp:  [98.2 °F (36.8 °C)-102 °F (38.9 °C)] 98.9 °F (37.2 °C)  Pulse:  [70-98] 80  Resp:  [34-37] 35  SpO2:  [89 %-98 %] 91 %  BP: ()/(51-80) 100/56  Arterial Line BP: ()/(47-62) 114/54   Weight: 84.4 kg (186 lb)  Body mass index is 26.69 kg/m².      Intake/Output Summary (Last 24 hours) at 10/28/2020 1856  Last data filed at 10/28/2020 1800  Gross per 24 hour   Intake 2349 ml   Output 2635 ml   Net -286 ml       Physical Exam  Vitals signs and nursing note reviewed.   Constitutional:       General: He is not in acute distress.     Appearance: Normal appearance. He is ill-appearing.      Interventions: He is sedated, chemically paralyzed and intubated.   Cardiovascular:      Rate and Rhythm: Regular rhythm. Tachycardia present.      Heart sounds: Normal heart sounds.   Pulmonary:      Effort: He is intubated.      Breath sounds: No wheezing, rhonchi or rales.      Comments: Coarse breath sounds  Abdominal:      General: Abdomen is flat. Bowel sounds are decreased.   Musculoskeletal:      Right lower leg: No edema.      Left lower leg: No edema.   Neurological:      Mental Status: He is unresponsive.      GCS: GCS eye subscore is 1. GCS verbal subscore is 1. GCS motor subscore is 1.      Comments: Patient on NMB         Vents:  Vent Mode: A/C (10/28/20 1708)  Set Rate: 35 BPM (10/28/20 1708)  Vt Set: 460 mL (10/28/20 1708)  PEEP/CPAP: 12 cmH20 (10/28/20 1708)  Oxygen Concentration (%): 70 (10/28/20 1845)  Peak Airway Pressure: 29 cmH2O (10/28/20 1708)  Plateau Pressure: 28 cmH20 (10/28/20 1708)  Total Ve: 17.1 mL (10/28/20 1708)  F/VT Ratio<105 (RSBI): (!) 71.28  (10/28/20 1637)  Lines/Drains/Airways     Central Venous Catheter Line            Percutaneous Central Line Insertion/Assessment - Triple Lumen  10/26/20 1940 right internal jugular 1 day          Drain                 Urethral Catheter 10/26/20 0900 Non-latex 16 Fr. 2 days         NG/OG Tube 10/26/20 2000 orogastric 16 Fr. Left mouth 1 day          Airway                 Airway - Non-Surgical 10/26/20 1820 Endotracheal Tube 2 days       Airway Anesthesia 10/26/20 2 days          Arterial Line            Arterial Line 10/26/20 1909 Left Radial 1 day          Peripheral Intravenous Line                 Peripheral IV - Single Lumen 10/25/20 1845 22 G Anterior;Left Forearm 3 days         Peripheral IV - Single Lumen 10/26/20 0925 20 G;1 1/4 in Anterior;Distal;Right Upper Arm 2 days         Peripheral IV - Single Lumen 10/26/20 1633 20 G;1 1/4 in Anterior;Left;Proximal Forearm 2 days              Significant Labs:    CBC/Anemia Profile:  Recent Labs   Lab 10/26/20  2235 10/27/20  0403 10/28/20  0448   WBC 19.71* 15.40* 8.64   HGB 16.2 15.7 14.3   HCT 52.3 51.1 45.6    147* 141*   * 101* 100*   RDW 12.6 12.5 12.5        Chemistries:  Recent Labs   Lab 10/27/20  0403  10/27/20  0748 10/27/20  1612 10/28/20  0448 10/28/20  1518     --   --  143 143 142   K 6.6*   < >  --  5.6* 5.3* 5.3*     --   --  106 107 109   CO2 26  --   --  26 26 26   BUN 66*  --   --  72* 71* 70*   CREATININE 1.6*  --   --  2.1* 2.0* 1.9*   CALCIUM 7.4*  --   --  7.5* 7.5* 7.4*   ALBUMIN 1.8*  --   --  1.6* 1.5* 1.4*   PROT 6.2  --   --   --  5.5* 5.3*   BILITOT 0.2  --   --   --  0.2 0.2   ALKPHOS 176*  --   --   --  103 93   ALT 33  --   --   --  31 31   AST 48*  --   --   --  50* 62*   MG 2.5  --   --   --  3.2*  --    PHOS 9.3*   < > 6.0* 4.7* 4.9*  --     < > = values in this interval not displayed.       All pertinent labs within the past 24 hours have been reviewed.    Significant Imaging:  I have reviewed all  pertinent imaging results/findings within the past 24 hours.      ABG  Recent Labs   Lab 10/28/20  1528   PH 7.346*   PO2 86   PCO2 52.3*   HCO3 28.6*   BE 3     Assessment/Plan:     Psychiatric  Anxiety  Continue home vilazodone.  Morphine and ativan PRN ordered.     Pulmonary  Reactive airway disease  Follows with Pulm as outpatient. Last seen 8/27/20, told he had RAD  He was also seen by a [facility] pulmonologist who did spirometry that reportedly showed borderline or mild asthma, and has been controlled with albuterol.   Former smoker, started at age 10-12 and quit 10 years ago  -- Continue inhalational treatments    Other  * Pneumonia due to COVID-19 virus  Patient received Azithro and ceftriaxone (x1 each) in the ED on 10/18 and admitted to hospital medicine where he continued to deteriorate needing higher oxygen requirements and Critical Care Medicine was consulted. He was intubated and eventually placed on NMB and proned    - currently on cefepime and vanc  - s/p remdesivir (10/18-10/22)  - s/p Dexamethasone (10/27)  - Albuterol treatment PRN  - continue Heparin gtt  - continue LPV  - ABG daily to assess PF ratio for proning criteria  - Sputum culture sent 10/27 NGTD      Acute hypoxemic respiratory failure due to COVID-19  Secondary to COVID (see above)      Critical Care Time: 55 minutes  Critical secondary to Patient has a condition that poses threat to life and bodily function: Acute resp failure 2/2 covid      Critical care was time spent personally by me on the following activities: development of treatment plan with patient or surrogate and bedside caregivers, discussions with consultants, evaluation of patient's response to treatment, examination of patient, ordering and performing treatments and interventions, ordering and review of laboratory studies, ordering and review of radiographic studies, pulse oximetry, re-evaluation of patient's condition. This critical care time did not overlap with that of any  other provider or involve time for any procedures.     Chris Gibbons NP  Critical Care Medicine  [facility] - ICU 16 WT

## 2022-06-29 NOTE — ASSESSMENT & PLAN NOTE
-Multifocal brain hemorrhages with IPH and SAH on imaging  -Neurology following  -s/p platelet transfusion 6/29 for acute on chronic thrombocytopenia  -Serial neurologic exams   -HOB >30  -Maintain normothermia, normovolemia, normoglycemia  -SCDs for DVT prophylaxis

## 2022-06-29 NOTE — PROGRESS NOTES
Pharmacy Vancomycin Kinetics Note for 6/28/2022     71 y.o. female on Vancomycin day # 1     Vancomycin Indication (Two level/Trough based Dosing): CNS Infection (goal trough 18-22)    Provider specified end date: 07/03/22    Active Antibiotics (From admission, onward)    Ordered     Ordering Provider       Tue Jun 28, 2022  7:45 PM    06/28/22 1945  acyclovir (ZOVIRAX) 478 mg in  mL IVPB  EVERY 8 HOURS         Jovanni Connelly M.D.       Tue Jun 28, 2022  7:41 PM    06/28/22 1941  vancomycin (VANCOCIN) 1,500 mg in  mL IVPB  (vancomycin (VANCOCIN) IV (LD + Maintenance))  ONCE         Jovanni Connelly M.D.       Tue Jun 28, 2022  7:36 PM    06/28/22 1936  cefTRIAXone (Rocephin) syringe 2 g  EVERY 12 HOURS         Jovanni Connelly M.D.       Tue Jun 28, 2022  7:35 PM    06/28/22 1935  ampicillin (Omnipen) 2,000 mg in  mL IVPB  EVERY 6 HOURS         Jovanni Connelly M.D.    06/28/22 1935  MD Alert...Vancomycin per Pharmacy  PHARMACY TO DOSE        Question:  Indication(s) for vancomycin?  Answer:  Unknown source of infection    Jovanni Connelly M.D.          Dosing Weight: 57 kg (125 lb 10.6 oz)      Admission History: Admitted on 6/28/2022 for Intraparenchymal hemorrhage of brain (HCC) [I61.9]  Pertinent history: patient with multifocal IPH and SAH concerning for angioinvasive infectious process . Patient hypotensive requiring pressors. empiric abx for CNS infection initiated.    Allergies:     No known drug allergy     Pertinent cultures to date:     Results     Procedure Component Value Units Date/Time    BLOOD CULTURE [[id number]]     Order Status: Canceled Specimen: Blood from Peripheral     GRAM STAIN [[id number]] Collected: 06/28/22 1955    Order Status: Completed Specimen: CSF Updated: 06/28/22 2123     Significant Indicator .     Source CSF     Site TAP     Gram Stain Result Rare WBCs.  No organisms seen.      Narrative:      Collected By: [id number] GREEN AUGUST  Collected By: [id number] GREEN AUGUST    CSF  "CULTURE [[id number]] Collected: 22    Order Status: Sent Specimen: CSF from Tap Updated: 22     Significant Indicator NEG     Source CSF     Site TAP     Culture Result -     Gram Stain Result Rare WBCs.  No organisms seen.      Narrative:      Collected By: [id number] GREEN AUGUST  Collected By: [id number] GREEN AUGUST    BLOOD CULTURE [[id number]] Collected: 22    Order Status: Sent Specimen: Blood from Peripheral     BLOOD CULTURE [[id number]] Collected: 22    Order Status: Sent Specimen: Blood from Peripheral           Labs:     Estimated Creatinine Clearance: 35.1 mL/min (by C-G formula based on SCr of 1.11 mg/dL).  No results for input(s): WBC, NEUTSPOLYS, BANDSSTABS in the last 72 hours.  Recent Labs     22   BUN 31*   CREATININE 1.11   ALBUMIN 2.6*       Intake/Output Summary (Last 24 hours) at 2022  Last data filed at 2022  Gross per 24 hour   Intake 1014.05 ml   Output --   Net 1014.05 ml      BP (!) 87/64   Pulse (!) 102   Temp (!) 38.1 °C (100.6 °F) (Bladder)   Resp (!) 30   Ht 1.549 m (5' 0.98\")   Wt 57 kg (125 lb 10.6 oz)   SpO2 98%  Temp (24hrs), Av.1 °C (100.6 °F), Min:38.1 °C (100.6 °F), Max:38.1 °C (100.6 °F)      List concerns for Vancomycin clearance:     Age;Hypermetabolic State (SIRS);Pressors/Hypotension;Nephrotoxic drugs;Malnutrition/Low albumin;Abnormal LFTs    Pharmacokinetics:     Trough kinetics:   No results for input(s): VANCOTROUGH, VANCOPEAK, VANCORANDOM in the last 72 hours.    A/P:     -  Vancomycin dose: pulse dosing    -  Next vancomycin level(s):    - Vr @ 0930 on     -  Comments: empiric abx for CNS infection. Significant concerns for accumulation given hypotension requiring pressors, age, elevated Scr from baseline, therefore pulse dosing initiated. Random 12 hour level ordered to assess clearance. Pharmacy to adjust dosing based on clearance concerns, levels and cultures.       Kenton Chun, " PharmD

## 2022-06-29 NOTE — CONSULTS
"INFECTIOUS DISEASES INPATIENT CONSULT NOTE     Date of Service: 6/29/2022    Consult Requested By: Sidney Frost M.D.    Reason for Consultation: \"sepsis of unknown origin\"    History of Present Illness:   Candelaria Murphy is a 71 y.o. female admitted 6/28/2022 after being found down by her .Per  she had gone to her PCP multiple times over the past couple weeks for abdominal pain.  Has chronic back pain after multiple surgeries.  No fever, night sweats, HA or other complaints. No sick contacts. No travel. Only animal exposure is their pet Laborador.  When he came home from work on 6/28, he found her on the floor in the home and difficult to arouse.    Review of outside records show GCS was 6-7 with posturing. Afebrile initially. She reportedly had a rectal temperature of 38.9 and was intubated in the ED for airway protection  CT reportedly showed a 1.5 cm intraparenchymal hemorrhage in the right frontal lobe with trace subarachnoid hemorrhage. On arrival she reportedly was nonverbal, responsive only to painful stimuli.   She whas been started on vancomycin,ceftriaxone, ampicillin, acyclovir, Keppra,   Cultures were obtained.  LP unrevealing-gram stain neg for bacteria WBC only 88. +Leukocytosis 18.4, troponin reportedly markedly elevated at 11,000, no ST change on EKG, lactic acid was 3, CT chest/abdomen/pelvis without contrast was reportedly unremarkable.    Blood cultures were obtained and transfer request to higher level of care with neurology and neurosurgical support.  Lives in [place]   On arrival, she was hypotensive (73/57) . On norepinephrine.    Infectious Diseases consulted for antibiotic recommendation and management      Review Of Systems:  Patient intubated and sedated  Per  pain in and prior to admission  Chronic back pain     PMH:   Past Medical History:   Diagnosis Date   • Bell's palsy    • COPD    • Infectious disease     Hepatitis C   • Psychiatric problem     " depression         PSH:  Past Surgical History:   Procedure Laterality Date   • LUMBAR LAMINECTOMY DISKECTOMY  1/7/2009    Performed by SHADI VIZCAINO at [facility]   • FORAMINOTOMY  1/7/2009    Performed by SHADI VIZCAINO at [facility]       FAMILY HX:  Hailey gehrigs disease    SOCIAL HX:1 ppd  Social History     Socioeconomic History   • Marital status: Single     Spouse name: Not on file   • Number of children: Not on file   • Years of education: Not on file   • Highest education level: Not on file   Occupational History   • Not on file   Tobacco Use   • Smoking status: Current Every Day Smoker     Packs/day: 0.50     Types: Cigarettes   • Smokeless tobacco: Never Used   Substance and Sexual Activity   • Alcohol use: No   • Drug use: No   • Sexual activity: Not on file   Other Topics Concern   • Not on file   Social History Narrative   • Not on file     Social Determinants of Health     Financial Resource Strain: Not on file   Food Insecurity: Not on file   Transportation Needs: Not on file   Physical Activity: Not on file   Stress: Not on file   Social Connections: Not on file   Intimate Partner Violence: Not on file   Housing Stability: Not on file     Social History     Tobacco Use   Smoking Status Current Every Day Smoker   • Packs/day: 0.50   • Types: Cigarettes   Smokeless Tobacco Never Used     Social History     Substance and Sexual Activity   Alcohol Use No       Allergies/Intolerances:  Allergies   Allergen Reactions   • No Known Drug Allergy          Other Current Medications:    Current Facility-Administered Medications:   •  sodium bicarbonate 150 mEq in D5W infusion (premix), , Intravenous, Continuous, Sidney Frost M.D., Last Rate: 83 mL/hr at 06/29/22 0840, New Bag at 06/29/22 0840  •  dexmedetomidine (Precedex) 400 mcg/100mL D5W premix infusion, 0.1-1.5 mcg/kg/hr, Intravenous, Continuous, STERLING Lund, Last Rate: 2.9 mL/hr at 06/29/22 0818, 0.2  mcg/kg/hr at 06/29/22 0818  •  Pharmacy Consult: Enteral tube insertion - review meds/change route/product selection, 1 Each, Other, PHARMACY TO DOSE, STERLING Lund  •  [START ON 6/30/2022] acyclovir (ZOVIRAX) 478 mg in  mL IVPB, 10 mg/kg (Ideal), Intravenous, Q24HRS, Sidney Frost M.D.  •  ampicillin (Omnipen) 2,000 mg in  mL IVPB, 2,000 mg, Intravenous, Q6HRS, Jovanni Connelly M.D., Stopped at 06/29/22 0548  •  levETIRAcetam (Keppra) injection 1,000 mg, 1,000 mg, Intravenous, Q12HRS, Jovanni Connelly M.D., 1,000 mg at 06/29/22 0518  •  cefTRIAXone (Rocephin) syringe 2 g, 2 g, Intravenous, Q12HRS, Jovanni Connelly M.D., 2 g at 06/29/22 0518  •  norepinephrine (Levophed) 8 mg in 250 mL NS infusion (premix), 0-30 mcg/min, Intravenous, Continuous, Dimas Bonds M.D., Last Rate: 15 mL/hr at 06/29/22 0931, 8 mcg/min at 06/29/22 0931  •  vasopressin (VASOSTRICT) 20 Units in  mL Infusion, 0.03 Units/min, Intravenous, Continuous, Dimas Bonds M.D., Last Rate: 9 mL/hr at 06/29/22 0740, 0.03 Units/min at 06/29/22 0740  •  Respiratory Therapy Consult, , Nebulization, Continuous RT, Dimas Bonds M.D.  •  famotidine (PEPCID) tablet 20 mg, 20 mg, Enteral Tube, DAILY **OR** famotidine (PEPCID) injection 20 mg, 20 mg, Intravenous, DAILY, Dimas Bonds M.D., 20 mg at 06/29/22 0436  •  senna-docusate (PERICOLACE or SENOKOT S) 8.6-50 MG per tablet 2 Tablet, 2 Tablet, Enteral Tube, BID **AND** polyethylene glycol/lytes (MIRALAX) PACKET 1 Packet, 1 Packet, Enteral Tube, QDAY PRN **AND** magnesium hydroxide (MILK OF MAGNESIA) suspension 30 mL, 30 mL, Enteral Tube, QDAY PRN **AND** bisacodyl (DULCOLAX) suppository 10 mg, 10 mg, Rectal, QDAY PRN, Dimas Bonds M.D.  •  MD Alert...ICU Electrolyte Replacement per Pharmacy, , Other, PHARMACY TO DOSE, Dimas Bonds M.D.  •  lidocaine (XYLOCAINE) 1 % injection 2 mL, 2 mL, Tracheal Tube, Q30 MIN PRN, Dimas Bonds M.D.  •  fentaNYL (SUBLIMAZE) injection  "25 mcg, 25 mcg, Intravenous, Q HOUR PRN **OR** fentaNYL (SUBLIMAZE) injection 50 mcg, 50 mcg, Intravenous, Q HOUR PRN **OR** fentaNYL (SUBLIMAZE) injection 100 mcg, 100 mcg, Intravenous, Q HOUR PRN, Dimas Bonds M.D.  •  propofol (DIPRIVAN) injection, 0-80 mcg/kg/min, Intravenous, Continuous, Stopped at 22 0835 **AND** Triglycerides Starting now and then Every 3 Days, , , Every 3 Days (300), Dimas Bonds M.D.  •  acetaminophen (TYLENOL) suppository 650 mg, 650 mg, Rectal, Q4HRS PRN, Rodo Story M.D., 650 mg at 22 0818      Most Recent Vital Signs:  /78   Pulse 95   Temp (!) 38.1 °C (100.6 °F) (Bladder)   Resp (!) 23   Ht 1.549 m (5' 0.98\")   Wt 57 kg (125 lb 10.6 oz)   SpO2 100%   BMI 23.76 kg/m²   Temp  Av.1 °C (100.6 °F)  Min: 38.1 °C (100.6 °F)  Max: 38.1 °C (100.6 °F)    Physical Exam:  General: Intubated and sedated   HEENT: NCAT, PERRLA, sclera anicteric, PERRL, EOMI,  no oral lesions   Neck: supple, no lymphadenopathy  Chest: CTAB, unlabored.  Cardiac: RRR,  no m/r/g   Abdomen: + bowel sounds, soft, non-tender, non-distended  Extremities:+cyanotic, clubbing. no edema, 2+ pulses  Skin: no rashes   Neuro: sedated  Psych: sedated    Pertinent Lab Results:  Recent Labs     22  043   WBC 7.3      Recent Labs     22   HEMOGLOBIN 12.6   HEMATOCRIT 39.3   MCV 97.8   MCH 31.3   PLATELETCT 80*         Recent Labs     22  1950 22  0430 22  0942   SODIUM 139 140 140   POTASSIUM 3.1* 5.2 4.4   CHLORIDE 107 107 106   CO2 20 15* 17*   CREATININE 1.11 1.95* 2.17*        Recent Labs     22  1950 22  0430 22  0942   ALBUMIN 2.6* 2.6* 2.4*        Pertinent Micro:  Results     Procedure Component Value Units Date/Time    Blood Culture,Hold [[id number]] Collected: 22 2130    Order Status: Completed Updated: 22 0536     Blood Culture Hold Collected    Blood Culture,Hold [[id number]] Collected: 22 0535    Order Status: " "Completed Updated: 06/29/22 0535     Blood Culture Hold Collected    BLOOD CULTURE [[id number]]     Order Status: Canceled Specimen: Blood from Peripheral     GRAM STAIN [[id number]] Collected: 06/28/22 1955    Order Status: Completed Specimen: CSF Updated: 06/28/22 2123     Significant Indicator .     Source CSF     Site TAP     Gram Stain Result Rare WBCs.  No organisms seen.      Narrative:      Collected By: [id number] GREEN AUGUST  Collected By: [id number] GREEN AUGUST    CSF CULTURE [[id number]] Collected: 06/28/22 1955    Order Status: Sent Specimen: CSF from Tap Updated: 06/28/22 2122     Significant Indicator NEG     Source CSF     Site TAP     Culture Result -     Gram Stain Result Rare WBCs.  No organisms seen.      Narrative:      Collected By: [id number] GREEN AUGUST  Collected By: [id number] GREEN AUGUST    BLOOD CULTURE [[id number]] Collected: 06/28/22 2100    Order Status: Sent Specimen: Blood from Peripheral     BLOOD CULTURE [[id number]] Collected: 06/28/22 2100    Order Status: Sent Specimen: Blood from Peripheral         Blood Culture Hold   Date Value Ref Range Status   06/28/2022 Collected  Final        Studies:  IMPRESSION:MRI brain 6/28   1.  Acute small 1.7 x 1.5 cm intraparenchymal hemorrhage in the right frontal juxta cortical white matter.   2.  Curvilinear subarachnoid hemorrhage noted in the right temporal sulci..   3.  Innumerable tiny chronic \"microbleed\" throughout the brain parenchyma. All of the above findings are suspicious for Amyloid Angiopathy.   4.  Age-related cortical atrophy.   5.  Extensive periventricular white matter changes consistent with chronic microvascular ischemic disease    MRA neg  IMPRESSION:   Multifocal intraparenchymal hemorrhage with small right frontal IPH and by hemispheric SAH   Likely seizure  Shock-neurogenic+distributive  No infection identified  Chronic thrombocytopenia-likely secondary to Hep C  Shock liver  Resp failure/vent  Myocardial ischemia  Lactic " acidosis  Rhabdomyolysis  RADHA  Tobacco 1ppd  Hep C    PLAN:   DC vanco as no resistant pneumococcus and worsening renal function  Add daptomycin if +blood cultures  Meningitis panel neg-low suspicion as cause of ICH  Given critical illness, continue acyclovir pending HSV PCR  Not immunosuppressed-doubt CMV  Continue ceftriaxone/Amp pending CSF culture results  Hep panel, RPR, HIV if not already done    Plan of care discussed with IM Sidney Frost M.D.. Will continue to follow    Tiffanie Girard M.D.

## 2022-06-29 NOTE — PROGRESS NOTES
Neurology Progress Note  Neurohospitalist Service, [facility]    Referring Physician: Sidney Frost M.D.      Interval History:  Remains on vasopressors for hemodynamic support.  CSF with pleocytosis, PMN predominant, elevated protein.  MRI brain with multifocal hemorrhages, multiple cerebral microbleeds, punctate ischemic strokes in various vascular territories, diffuse leukodystrophy.  No clear abnormal enhancement seen.    Review of systems: In addition to what is detailed in the HPI and/or updated in the interval history, all other systems reviewed and are negative.    Past Medical History, Past Surgical History and Social History reviewed and unchanged from prior    Medications:    Current Facility-Administered Medications:   •  sodium bicarbonate 150 mEq in D5W infusion (premix), , Intravenous, Continuous, Sidney Frost M.D., Last Rate: 83 mL/hr at 06/29/22 0840, New Bag at 06/29/22 0840  •  dexmedetomidine (Precedex) 400 mcg/100mL D5W premix infusion, 0.1-1.5 mcg/kg/hr, Intravenous, Continuous, KASH Lund.P.R.N., Last Rate: 2.9 mL/hr at 06/29/22 0818, 0.2 mcg/kg/hr at 06/29/22 0818  •  Pharmacy Consult: Enteral tube insertion - review meds/change route/product selection, 1 Each, Other, PHARMACY TO DOSE, KASH Lund.P.R.N.  •  [START ON 6/30/2022] acyclovir (ZOVIRAX) 478 mg in  mL IVPB, 10 mg/kg (Ideal), Intravenous, Q24HRS, Sidney Frost M.D.  •  ampicillin (Omnipen) 2,000 mg in  mL IVPB, 2,000 mg, Intravenous, Q6HRS, Jovanni Connelly M.D., Stopped at 06/29/22 0548  •  levETIRAcetam (Keppra) injection 1,000 mg, 1,000 mg, Intravenous, Q12HRS, Jovanni Connelly M.D., 1,000 mg at 06/29/22 0518  •  cefTRIAXone (Rocephin) syringe 2 g, 2 g, Intravenous, Q12HRS, Jovanni Connelly M.D., 2 g at 06/29/22 0518  •  norepinephrine (Levophed) 8 mg in 250 mL NS infusion (premix), 0-30 mcg/min, Intravenous, Continuous, Dimas Bonds M.D., Last Rate: 15 mL/hr at 06/29/22  "0931, 8 mcg/min at 06/29/22 0931  •  vasopressin (VASOSTRICT) 20 Units in  mL Infusion, 0.03 Units/min, Intravenous, Continuous, Dimas Bonds M.D., Last Rate: 9 mL/hr at 06/29/22 0740, 0.03 Units/min at 06/29/22 0740  •  Respiratory Therapy Consult, , Nebulization, Continuous RT, Dimas Bonds M.D.  •  famotidine (PEPCID) tablet 20 mg, 20 mg, Enteral Tube, DAILY **OR** famotidine (PEPCID) injection 20 mg, 20 mg, Intravenous, DAILY, Dimas Bonds M.D., 20 mg at 06/29/22 0436  •  senna-docusate (PERICOLACE or SENOKOT S) 8.6-50 MG per tablet 2 Tablet, 2 Tablet, Enteral Tube, BID **AND** polyethylene glycol/lytes (MIRALAX) PACKET 1 Packet, 1 Packet, Enteral Tube, QDAY PRN **AND** magnesium hydroxide (MILK OF MAGNESIA) suspension 30 mL, 30 mL, Enteral Tube, QDAY PRN **AND** bisacodyl (DULCOLAX) suppository 10 mg, 10 mg, Rectal, QDAY PRN, Dimas Bonds M.D.  •  MD Alert...ICU Electrolyte Replacement per Pharmacy, , Other, PHARMACY TO DOSE, Dimas Bonds M.D.  •  lidocaine (XYLOCAINE) 1 % injection 2 mL, 2 mL, Tracheal Tube, Q30 MIN PRN, Dimas Bonds M.D.  •  fentaNYL (SUBLIMAZE) injection 25 mcg, 25 mcg, Intravenous, Q HOUR PRN **OR** fentaNYL (SUBLIMAZE) injection 50 mcg, 50 mcg, Intravenous, Q HOUR PRN **OR** fentaNYL (SUBLIMAZE) injection 100 mcg, 100 mcg, Intravenous, Q HOUR PRN, Dimas Bonds M.D.  •  propofol (DIPRIVAN) injection, 0-80 mcg/kg/min, Intravenous, Continuous, Stopped at 06/29/22 0835 **AND** Triglycerides Starting now and then Every 3 Days, , , Every 3 Days (0300), Dimas Bonds M.D.  •  acetaminophen (TYLENOL) suppository 650 mg, 650 mg, Rectal, Q4HRS PRN, Rodo Story M.D., 650 mg at 06/29/22 0818    Physical Examination:   /78   Pulse 95   Temp (!) 38.1 °C (100.6 °F) (Bladder)   Resp (!) 23   Ht 1.549 m (5' 0.98\")   Wt 57 kg (125 lb 10.6 oz)   SpO2 100%   BMI 23.76 kg/m²       General: Sedated, non-interactive  Neck: There is normal range of motion  CV: Regular " rate   Extremities:  Warm, dry, and intact, without peripheral lower extremity edema    NEUROLOGICAL EXAM:     Mental status: Sedated, non-interactive  Speech and language: Intubated, no command following  Cranial nerve exam: Pupils are equal, round and reactive to light bilaterally. No blink to threat, no tracking.  Face is symmetric.   Motor exam: No movements to noxious in all extremities, with exception of triple flexion in R leg  Sensory exam:  As above  Coordination: No movements, cannot be tested    Objective Data:    Labs:  Lab Results   Component Value Date/Time    PROTHROMBTM 19.5 (H) 06/28/2022 07:50 PM    INR 1.70 (H) 06/28/2022 07:50 PM      Lab Results   Component Value Date/Time    WBC 4.7 (L) 06/29/2022 09:42 AM    RBC 3.72 (L) 06/29/2022 09:42 AM    HEMOGLOBIN 11.7 (L) 06/29/2022 09:42 AM    HEMATOCRIT 34.6 (L) 06/29/2022 09:42 AM    MCV 93.0 06/29/2022 09:42 AM    MCH 31.5 06/29/2022 09:42 AM    MCHC 33.8 06/29/2022 09:42 AM    MPV 13.2 (H) 06/29/2022 09:42 AM    NEUTSPOLYS 68.70 06/29/2022 04:30 AM    LYMPHOCYTES 1.70 (L) 06/29/2022 04:30 AM    MONOCYTES 1.70 06/29/2022 04:30 AM    EOSINOPHILS 0.00 06/29/2022 04:30 AM    BASOPHILS 0.00 06/29/2022 04:30 AM      Lab Results   Component Value Date/Time    SODIUM 140 06/29/2022 09:42 AM    POTASSIUM 4.4 06/29/2022 09:42 AM    CHLORIDE 106 06/29/2022 09:42 AM    CO2 17 (L) 06/29/2022 09:42 AM    GLUCOSE 117 (H) 06/29/2022 09:42 AM    BUN 43 (H) 06/29/2022 09:42 AM    CREATININE 2.17 (H) 06/29/2022 09:42 AM    CREATININE 0.8 01/10/2009 04:30 AM      Lab Results   Component Value Date/Time    CHOLSTRLTOT 204 (H) 07/30/2016 04:15 AM     (H) 07/30/2016 04:15 AM    HDL 31 (A) 07/30/2016 04:15 AM    TRIGLYCERIDE 81 06/28/2022 07:50 PM       Lab Results   Component Value Date/Time    ALKPHOSPHAT 62 06/29/2022 09:42 AM    ASTSGOT 4392 () 06/29/2022 09:42 AM    ALTSGPT 3051 () 06/29/2022 09:42 AM    TBILIRUBIN 0.8 06/29/2022 09:42 AM     "    Imaging/Testing:    I interpreted and/or reviewed the patient's neuroimaging    US-RENAL   Final Result      1. Normal sonographic evaluation of the kidneys. No stones or hydronephrosis.   2. The bladder is decompressed around a Lowery catheter.      EC-ECHOCARDIOGRAM COMPLETE W/O CONT         OUTSIDE IMAGES-DX CHEST   Final Result      OUTSIDE IMAGES-CT HEAD   Final Result      OUTSIDE IMAGES-CT CERVICAL SPINE   Final Result      OUTSIDE IMAGES-CT CHEST   Final Result      MR-MRA HEAD-W/O   Final Result         MRA OF THE [place] OF [place]LTER WITHIN NORMAL LIMITS.      MR-BRAIN-WITH & W/O   Final Result      1.  Acute small 1.7 x 1.5 cm intraparenchymal hemorrhage in the right frontal juxta cortical white matter.      2.  Curvilinear subarachnoid hemorrhage noted in the right temporal sulci..      3.  Innumerable tiny chronic \"microbleed\" throughout the brain parenchyma. All of the above findings are suspicious for Amyloid Angiopathy.      4.  Age-related cortical atrophy.      5.  Extensive periventricular white matter changes consistent with chronic microvascular ischemic disease.      DX-CHEST-LIMITED (1 VIEW)   Final Result         1.  Pulmonary edema and/or infiltrates.   2.  Cardiomegaly   3.  Atherosclerosis      DX-ABDOMEN FOR TUBE PLACEMENT    (Results Pending)       Assessment and Plan:  Candelaria Murphy is a 71 year old woman, found down with poor GCS, requiring intubation, with shock physiology and found to have multifocal acute brain hemorrhages.  Her initial neurologic exam (essentially comatose) was out of proportion to her hemorrhage burden, and more suggestive of global cerebral dysfunction.  CSF studies suggestive of florid inflammation, and infectious CSF panel was negative including HSV PCR. MRI brain with additional acute findings- including multifocal, punctate ischemic strokes, innumerable cerebral microbleeds, and diffuse leukodystrophy that is out of proportion to her known vascular risk " factors.  It is difficult determine if the diffuse white matter disease is part of her acute process.    At this time, will continue with empiric antibiotics and antivirals.  Will attain ECHO and blood cultures to assess for infective endocarditis as brain findings could reflect septic emboli (with exception that the leukodystrophy, and degree of microbleeds is abnormal).  Recommend ID consult to determine possible need to expand empiric coverage to include anti-TB or antifungals.    After discussion with my Neurology colleagues- an alternative, non-infectious etiology may be Hurst disease- or hemorrhagic leukoencephalitis.  This is an acute disease process, typically post-infectious, that is demyelinating in nature (along the spectrum of ADEM).  Patients can present in coma, with shock physiology- MRI findings of diffuse white matter disease, non-enhancing in nature, along with microbleeds, nicole brain hemorrhages.  CSF is usually inflammatory, and interestingly PMN predominance similar to this patient's profile.  Prognosis is typically poor given multiorgan failure, but there are few case reports that immunomodulatory therapies such as steroids/PLEX can improve neurologic status.  May consider empiric immunomodulatory treatment once patient is thought to be best empirically covered for infectious etiologies.    Finally- she has developed multiorgan failure- including brain, lungs, kidneys, and liver.  I am concerned that her intracranial process is non-reversible and will likely further progressed despite supportive therapies.  Would start initiating GOC/family discussions given the rapid, clinical decline of uncertain etiology.    Problem list:  1.  Coma  2.  Multifocal brain hemorrhages  3.  Leukoencephalitis, NOS    Plan:   - q4h neurochecks, limit sedation if possible   - continue empiric antibiotics, antivirals coverage   - attain ID consultation for additional infectious workup or expanding empiric  coverage   - follow up TTE, blood cultures, dedicated HSV PCR   - maintain SBP , currently on vasopressor for pressure augmentation   - avoid all antithrombotics, SCDs only for DVT ppx   - will consider empiric immunomodulating therapy once ID consult obtained- including high dose steroids and IVIG.  I do not think she will be able to tolerate PLEX given already underlying coagulopathy (INR 1.7)   - do not recommend to actively correct INR 1.7 and platelets 59K given likely consumptive process in setting of critical illness and radiographic stability of bleed   - continue keppra 1000mg q12h, but doubt poor neurologic exam is exclusively related to seizures   - not a candidate for PT/OT/SLP     Upon my evaluation, this patient had a high probability of imminent or life-threatening deterioration due to intracranial hemorrhage which required my direct attention, intervention, and personal management.  I personally provided 60 minutes of total critical care time outside of time spent on separately billable/documented procedures. Time includes: review of laboratory data, review of radiology studies, discussion with consultants, discussion with family/patient, monitoring for potential decompensation.  Interventions were performed as documented in the chart.    The evaluation of the patient, and recommended management, was discussed with the ICU staff. I have performed a physical exam and reviewed and updated ROS and Plan today (6/29/2022).     Jovanni Connelly MD  Neurohospitalist, Acute Care Services

## 2022-06-29 NOTE — PROCEDURES
"Central venous catheter insertion    Date: 6/28/2022    Procedure: Central Venous Catheter Insertion    Indication: Shock,? Sepsis    Physician:  Dimas Bonds M.D.    Consent: Emergent    Procedure:  A pre-procedure \"time out\" was performed.  The patient was prepped and draped in the usual sterile fashion.  Using ultrasound guided Seldinger technique, a right internal jugular triple lumen central venous catheter was placed on the first attempt without difficulty under full sterile barrier precautions.  The guidewire was removed and confirmed.  All the ports were flushed using sterile saline and capped. The catheter was sutured in place and a sterile dressing was applied.  No immediate complications. A chest xray was ordered and will be reviewed.     "

## 2022-06-29 NOTE — PROGRESS NOTES
SACHIN INTENSIVIST DIRECT ADMISSION REPORT    Transferring facility: [facility] in [facility]  Transferring physician: Dr Middleton  Transferring facility/physician contact number: Per RTOC  Chief complaint: Found down, GCS 6, frontal intraparenchymal hemorrhage  Pertinent history & patient course: This is a 71-year-old female, reportedly on ASA only with no known medical history.  Her  reported she was last seen well this morning in her normal state of health.  He went to work and when he came home he found her on the floor in the home and difficult to arouse.  GCS was 6 on arrival.  She reportedly had a rectal temperature of 38.9 and was intubated in the ED for airway protection.  CT showed a 1.5 cm intraparenchymal hemorrhage in the right frontal lobe by report.  There was trace subarachnoid hemorrhage and a distant location.  On arrival she reportedly was nonverbal would open her eyes to painful stimulus only and not follow any commands.  She was given ceftriaxone, Keppra, 2 L IV fluid.  Cultures were obtained.  Abnormal labs include a white blood cell count of 18,000, troponin markedly elevated at 11,000 (normal less than 20), no ST change on EKG, lactic acid was 3, CT chest/abdomen/pelvis without contrast was reportedly unremarkable.  Urinalysis was pending.  Blood cultures were obtained and transfer request to higher level of care with neurology and neurosurgical support.  Pertinent imaging & lab results: As above  Code Status: Full code   Further work up or recommendations prior to transfer: As above  Consultants called prior to transfer and pertinent input from consultants: Neurology  Patient accepted for transfer: Yes  Consultants to be called upon arrival: Neurology, on-call intensivist  Floor requested: RICU  ADT order placed for accepted patient: Yes    Please inform the Intensivist upon assignment of an ICU bed and then again on arrival of the patient.

## 2022-06-29 NOTE — HOSPITAL COURSE
Candelaria Murphy is a 71 year old female with PMH significant for COPD, anxiety/depression on chronic benzodiazepine therapy, chronic pain on chronic opioid therapy who transferred here from [facility] with intraparenchymal hemorrhage. Per report, patients  returned home from work to find patient down on the floor, obtunded. On arrival to [place], GCS 6, SBP's 80, temp 102.2. Non-con head CT showed small right frontal intraparenchymal hemorrhage and bilateral cortically based SAH with extensive white matter disease. She was intubated, loaded with Keppra, given IVF bolus', and Ceftriaxone.   Patient also noted to have elevated troponin, leukocytosis, and lactic acid 3. CT chest/abdomen/pelvis reportedly without acute pathology.  En route to [facility], patient received sedation with Versed/Fentanyl and became more hypotensive requiring vasopressor support.   Lumbar puncture performed on arrival to [facility] 88 WBC, 59 protein, (-) HSV/encephalitis panel CSF.  6/29 - worsening RADHA, liver enzymes.

## 2022-06-29 NOTE — PROCEDURES
Lumbar Puncture Procedure Note    Date: 6/28/2022    Procedure: Lumbar puncture    Indication: Sepsis, possible CNS source    Consent: Emergent    Procedure: After timeout performed, appropriate site confirmed with landmarks and patient positioned lateral decubitus position, prepped, and draped in sterile fashion. All those present wearing cap and mask and those physically participating remained sterile with cap, mask, and gloves.  Using a spinal needle, approximately 10 cc mostly clear, slightly blood-tinged CSF was obtained. After obtaining an appropriate volume of CSF, needle removed with stylet in place and site bandaged.  No immediate complications    CSF appearance: Initially slightly blood-tinged, subsequently clear    CSF amount collected: 10 cc    Opening pressure: N/AA     EBL: minimal    Complications: None    Sample will be sent to the lab for the appropriate studies.

## 2022-06-29 NOTE — PROCEDURES
Arterial Catheter Procedure Note    Date: 6/28/2022    Procedure:  Arterial Catheter Insertion    Indication: Hypotension/sepsis    Physician:  Dr. Dimas Bonds MD    Consent:  Emergent    Procedure:  The patient is intubated and on full mechanical vent support with intraparenchymal hemorrhage and possible septic shock.  Arterial catheter is indicated for continuous invasive BP monitoring to titrate vasoactive agents.  The right axilla was prepped and draped using sterile barrier precautions.  Using real-time ultrasound guidance, a 20-gauge, 12 cm arterial catheter was placed in the right axillary artery on the first attempt without difficulty.  A good quality arterial waveform was noted on monitor.  The guidewire was removed and confirmed.  The catheter ws sutured in place and a sterile dressing was applied.  No immediate complications.  EBL < 5 cc.

## 2022-06-29 NOTE — PROGRESS NOTES
Critical Care Progress Note    Date of admission  2022    Chief Complaint  Altered mental status    Hospital Course  Candelaria Murphy is a 71 year old female with PMH significant for COPD, anxiety/depression on chronic benzodiazepine therapy, chronic pain on chronic opioid therapy who transferred here from [facility] with intraparenchymal hemorrhage. Per report, patients  returned home from work to find patient down on the floor, obtunded. On arrival to [place], GCS 6, SBP's 80, temp 102.2. Non-con head CT showed small right frontal intraparenchymal hemorrhage and bilateral cortically based SAH with extensive white matter disease. She was intubated, loaded with Keppra, given IVF bolus', and Ceftriaxone.   Patient also noted to have elevated troponin, leukocytosis, and lactic acid 3. CT chest/abdomen/pelvis reportedly without acute pathology.  En route to [facility], patient received sedation with Versed/Fentanyl and became more hypotensive requiring vasopressor support.   Lumbar puncture performed on arrival to [facility] 88 WBC, 59 protein, (-) HSV/encephalitis panel.       Interval Problem Update  Does not withdraw LUE, withdraws all others. + cough/corneal/gag.  No spontaneous movements, does not follow commands.  Temp 102 --> 100.4  HR 80-90's SR  SBP 's artline  Norepi 25, Vaso 0.03  Lowery 80 out overnight  Prop 20  Vent day #2: APVC CMV 18/290/Peep 8/30%  AB.31/30/106/HCO3 15/BE -10    Mobility 1    Updated daughter at bedside    Review of Systems  Review of Systems   Unable to perform ROS: Intubated        Vital Signs for last 24 hours   Temp:  [38.1 °C (100.6 °F)] 38.1 °C (100.6 °F)  Pulse:  [] 86  Resp:  [16-44] 36  BP: ()/(51-78) 99/58  SpO2:  [90 %-100 %] 99 %    Hemodynamic parameters for last 24 hours       Respiratory Information for the last 24 hours  Vent Mode: APVCMV  Rate (breaths/min): 18  Vt Target (mL): 340  PEEP/CPAP: 8  MAP: 12  Control VTE (exp VT):  332    Physical Exam   Physical Exam  Vitals and nursing note reviewed.   Constitutional:       General: She is not in acute distress.     Appearance: Normal appearance. She is ill-appearing.      Interventions: She is sedated and intubated.   HENT:      Head: Normocephalic.      Nose: Nose normal.      Mouth/Throat:      Lips: Pink.      Mouth: Mucous membranes are dry.   Eyes:      Pupils: Pupils are equal, round, and reactive to light.   Cardiovascular:      Rate and Rhythm: Normal rate and regular rhythm.      Pulses: Decreased pulses.      Heart sounds: Heart sounds are distant.   Pulmonary:      Effort: Pulmonary effort is normal. She is intubated.      Breath sounds: No rhonchi.   Abdominal:      General: Bowel sounds are decreased.      Palpations: Abdomen is soft.   Musculoskeletal:      Comments: No spontaneous movements  Withdraws   Skin:     Capillary Refill: Capillary refill takes 2 to 3 seconds.      Coloration: Skin is mottled.      Comments: All extremities cold, delayed cap refill   Neurological:      GCS: GCS eye subscore is 1. GCS verbal subscore is 1. GCS motor subscore is 4.      Comments: 6T   Psychiatric:      Comments: Intubated/Sedated         Medications  Current Facility-Administered Medications   Medication Dose Route Frequency Provider Last Rate Last Admin   • sodium bicarbonate 150 mEq in D5W infusion (premix)   Intravenous Continuous Sherrie Byrd A.P.R.N. 100 mL/hr at 06/29/22 1427 Rate Change at 06/29/22 1427   • dexmedetomidine (Precedex) 400 mcg/100mL D5W premix infusion  0.1-1.5 mcg/kg/hr Intravenous Continuous Sherrie Byrd A.P.R.N. 5.7 mL/hr at 06/29/22 1444 0.4 mcg/kg/hr at 06/29/22 1444   • Pharmacy Consult: Enteral tube insertion - review meds/change route/product selection  1 Each Other PHARMACY TO DOSE Sherrie Byrd, A.P.R.N.       • [START ON 6/30/2022] acyclovir (ZOVIRAX) 478 mg in  mL IVPB  10 mg/kg (Ideal) Intravenous Q24HRS Sidney Frost M.D.       •  lactated ringer BOLUS infusion  1,000 mL Intravenous Once STERLING Lund   Stopped at 06/29/22 1649   • ampicillin (Omnipen) 2,000 mg in  mL IVPB  2,000 mg Intravenous Q6HRS Jovanni Connelly M.D.   Stopped at 06/29/22 1318   • levETIRAcetam (Keppra) injection 1,000 mg  1,000 mg Intravenous Q12HRS Jovanni Connelly M.D.   1,000 mg at 06/29/22 0518   • cefTRIAXone (Rocephin) syringe 2 g  2 g Intravenous Q12HRS Jovanni Connelly M.D.   2 g at 06/29/22 0518   • norepinephrine (Levophed) 8 mg in 250 mL NS infusion (premix)  0-30 mcg/min Intravenous Continuous Dimas Bonds M.D. 11.3 mL/hr at 06/29/22 1453 6 mcg/min at 06/29/22 1453   • vasopressin (VASOSTRICT) 20 Units in  mL Infusion  0.03 Units/min Intravenous Continuous Dimas Bonds M.D. 9 mL/hr at 06/29/22 0740 0.03 Units/min at 06/29/22 0740   • Respiratory Therapy Consult   Nebulization Continuous RT Dimas Bonds M.D.       • famotidine (PEPCID) tablet 20 mg  20 mg Enteral Tube DAILY Dimas Bonds M.D.        Or   • famotidine (PEPCID) injection 20 mg  20 mg Intravenous DAILY Dimas Bonds M.D.   20 mg at 06/29/22 0436   • senna-docusate (PERICOLACE or SENOKOT S) 8.6-50 MG per tablet 2 Tablet  2 Tablet Enteral Tube BID Dimas Bonds M.D.        And   • polyethylene glycol/lytes (MIRALAX) PACKET 1 Packet  1 Packet Enteral Tube QDAY PRN Dimas Bonds M.D.        And   • magnesium hydroxide (MILK OF MAGNESIA) suspension 30 mL  30 mL Enteral Tube QDAY PRN Dimas Bonds M.D.        And   • bisacodyl (DULCOLAX) suppository 10 mg  10 mg Rectal QDAY PRN Dimas Bonds M.D.       • MD Alert...ICU Electrolyte Replacement per Pharmacy   Other PHARMACY TO DOSE Dimas Bonds M.D.       • lidocaine (XYLOCAINE) 1 % injection 2 mL  2 mL Tracheal Tube Q30 MIN PRN Dimas Bonds M.D.       • fentaNYL (SUBLIMAZE) injection 25 mcg  25 mcg Intravenous Q HOUR PRN Dimas Bonds M.D.        Or   • fentaNYL (SUBLIMAZE) injection 50 mcg  50 mcg Intravenous  Q HOUR PRN Dimas Bonds M.D.        Or   • fentaNYL (SUBLIMAZE) injection 100 mcg  100 mcg Intravenous Q HOUR PRN Dimas Bonds M.D.       • acetaminophen (TYLENOL) suppository 650 mg  650 mg Rectal Q4HRS PRN Rodo Story M.D.   650 mg at 06/29/22 0818       Fluids    Intake/Output Summary (Last 24 hours) at 6/29/2022 1552  Last data filed at 6/29/2022 1400  Gross per 24 hour   Intake 4819.18 ml   Output 120 ml   Net 4699.18 ml       Laboratory  Recent Labs     06/28/22 1954 06/29/22  0138   ISTATAPH 7.364* 7.337*   ISTATAPCO2 42.7* 27.8   ISTATAPO2 216* 95*   ISTATATCO2 26 16*   IEIOLMJ4VXI 100* 97   ISTATARTHCO3 24.4 14.9*   ISTATARTBE -1 -10*   ISTATTEMP 97.5 F 102.0 F   ISTATFIO2 60 30   ISTATSPEC Arterial Arterial   ISTATAPHTC 7.373* 7.310*   [id number] 213* 106*     Recent Labs     06/1950 06/29/22  0942   CPKTOTAL 2510* 2890*     Recent Labs     06/1950 06/28/22 2154 06/29/22 0430 06/29/22  0942   SODIUM 139  --  140 140   POTASSIUM 3.1*  --  5.2 4.4   CHLORIDE 107  --  107 106   CO2 20  --  15* 17*   BUN 31*  --  38* 43*   CREATININE 1.11  --  1.95* 2.17*   MAGNESIUM  --  1.5 2.8*  --    PHOSPHORUS  --  4.8* 5.7*  --    CALCIUM 7.6*  --  7.4* 6.8*     Recent Labs     06/1950 06/29/22  0430 06/29/22  0942   ALTSGPT 46 705* 3051*   ASTSGOT 161* 883* 4392*   ALKPHOSPHAT 41 52 62   TBILIRUBIN 0.7 1.0 0.8   GLUCOSE 132* 80 117*     Recent Labs     06/1950 06/29/22  0430 06/29/22  0942   WBC  --  7.3 4.7*   NEUTSPOLYS  --  68.70 59.60   LYMPHOCYTES  --  1.70* 5.30*   MONOCYTES  --  1.70 0.90   EOSINOPHILS  --  0.00 0.00   BASOPHILS  --  0.00 0.00   ASTSGOT 161* 883* 4392*   ALTSGPT 46 705* 3051*   ALKPHOSPHAT 41 52 62   TBILIRUBIN 0.7 1.0 0.8     Recent Labs     06/28/22  1950/22  0430 06/29/22  0942 06/29/22  1410   RBC  --  4.02* 3.72*  --    HEMOGLOBIN  --  12.6 11.7*  --    HEMATOCRIT  --  39.3 34.6*  --    PLATELETCT  --  80* 59*  --    PROTHROMBTM 19.5*  --   --   26.2*   APTT  --   --   --  43.6*   INR 1.70*  --   --  2.49*       Imaging  CT:    Reviewed  Echo:   Reviewed    Assessment/Plan  * Intraparenchymal hemorrhage of brain (HCC)- (present on admission)  Assessment & Plan  -Multifocal brain hemorrhages with IPH and SAH on imaging  -Neurology following  -s/p platelet transfusion for acute on chronic thrombocytopenia  -Serial neurologic exams   -HOB >30  -Keppra for seizure prophylaxis  -Maintain normothermia, normovolemia, normoglycemia  -SCDs for DVT prophylaxis        Thrombocytopenia (HCC)  Assessment & Plan  -per chart review, this appears to be chronic with baseline platelet count 110-130's  -monitor bleeding  -follow CBC    Elevated transaminase level  Assessment & Plan  -Suspect shock liver from sepsis and hypotension  -Avoid hepatotoxins  -Check hepatitis panel    Chronic prescription benzodiazepine use  Assessment & Plan  -PDMP reviewed  -Xanax as OP    Elevated troponin- (present on admission)  Assessment & Plan  -Per report from outside facility troponin was around 11,000  -Trending  -ECHO.  Reviewed with cardiology.  Global hypokinesis.  EF 30%  -Twelve-lead EKG with no ST segment changes  -Not a candidate for TERRY therapy  -Telemetry    Acute respiratory failure with hypoxia (HCC)- (present on admission)  Assessment & Plan  -Intubated date: 6/28/22  -Titrate ventilator prescription to maintain acid-base balance, oxygenation and ventilation  -Titrate FiO2 to keep sats > 92%  -ABCDEF bundle  -Pepcid, chlorhexidine, SCD  -HOB > 30    Septic shock (HCC)- (present on admission)  Assessment & Plan  -This is Sepsis Present on admission  -SIRS criteria identified on my evaluation include: Fever, with temperature greater than 101 deg F, Tachycardia, with heart rate greater than 90 BPM and Leukocytosis, with WBC greater than 12,000  -Source is unknown  -Fluid resuscitation ordered per protocol  -Crystalloid Fluid Administration: Fluid resuscitation completed.  She has  received total of 4.5 L   -IV antibiotics/antivirals as appropriate for source of sepsis, acyclovir, ampicillin, ceftriaxone and vancomycin  -Follow blood cultures drawn in outside ER  -Reassessment: I have reassessed the patient's hemodynamic status -additional 1 L LR fluid bolus over 4 hours due to EF 30%.  No vegetations noted on ECHO today.    -ID consult        Chronic, continuous use of opioids- (present on admission)  Assessment & Plan  -PDMP reviewed  -Acetaminophen-Cod #3 Tablet as OP    Chronic pain syndrome- (present on admission)  Assessment & Plan  -on duloxetine and gabapentin as OP  -Fentanyl IV for pain goal < 2  -PDMP reviewed       VTE:  Contraindicated  Ulcer: H2 Antagonist  Lines: Central Line  Ongoing indication addressed, Arterial Line  Ongoing indication addressed and Lowery Catheter  Ongoing indication addressed    I have performed a physical exam and reviewed and updated ROS and Plan today (6/29/2022). In review of yesterday's note (6/28/2022), there are no changes except as documented above.     Discussed patient condition and risk of morbidity and/or mortality with Family, RN, RT, Pharmacy, , Code status disscussed, Charge nurse / hot rounds, neurology and my attending Dr. Frost  The patient remains critically ill.  Critical care time = 85 minutes in directly providing and coordinating critical care and extensive data review.  No time overlap and excludes procedures.    Please note that this dictation was created using voice recognition software. I have made every reasonable attempt to correct obvious errors, but there may be errors of grammar and possibly content that I did not discover before finalizing the note.    DORETHA Lund.

## 2022-06-29 NOTE — ASSESSMENT & PLAN NOTE
-Per report from outside facility troponin was around 11,000  -ECHO.  Reviewed with cardiology.  Global hypokinesis.  EF 30%  -Twelve-lead EKG with no ST segment changes  -Not a candidate for ASA therapy  -Telemetry

## 2022-06-29 NOTE — ASSESSMENT & PLAN NOTE
-This is Sepsis Present on admission  -SIRS criteria identified on my evaluation include: Fever, with temperature greater than 101 deg F, Tachycardia, with heart rate greater than 90 BPM and Leukocytosis, with WBC greater than 12,000  -Source is blood.  MRSA bacteremia  -Crystalloid Fluid Administration: Fluid resuscitation completed.  She has received total of 4.5 L   -Antibiotics per ID recommendations  -No vegetations noted on ECHO.    -ID following -plan for 6 weeks of antibiotics once blood cultures are negative  -MRI C-spine T-spine L-spine today  -Assessment 6/30 -has received sufficient IV fluid resuscitation.  Continues to require norepinephrine and vasopressin to keep MAP greater than 65

## 2022-06-30 PROBLEM — B95.62 MRSA BACTEREMIA: Status: ACTIVE | Noted: 2022-01-01

## 2022-06-30 PROBLEM — R78.81 MRSA BACTEREMIA: Status: ACTIVE | Noted: 2022-01-01

## 2022-06-30 PROBLEM — N17.9 ACUTE KIDNEY INJURY (HCC): Status: ACTIVE | Noted: 2022-01-01

## 2022-06-30 PROBLEM — M62.82 RHABDOMYOLYSIS: Status: ACTIVE | Noted: 2022-01-01

## 2022-06-30 PROBLEM — R79.1 ELEVATED INR: Status: ACTIVE | Noted: 2022-01-01

## 2022-06-30 PROBLEM — G93.40 ACUTE ENCEPHALOPATHY: Status: ACTIVE | Noted: 2022-01-01

## 2022-06-30 NOTE — PROGRESS NOTES
Neurology Progress Note  Neurohospitalist Service, [facility]    Referring Physician: Sidney Frost M.D.      Interval History:  Poor neurologic exam off sedation- remains in coma, non-interactive.  Blood cultures with GPCs, speciation pending.  Worsening shock physiology- now on two pressors and in nicole multiorgan failure.    Review of systems: In addition to what is detailed in the HPI and/or updated in the interval history, all other systems reviewed and are negative.    Past Medical History, Past Surgical History and Social History reviewed and unchanged from prior    Medications:    Current Facility-Administered Medications:   •  sodium bicarbonate 150 mEq in D5W infusion (premix), , Intravenous, Continuous, Sherrie Byrd, A.P.R.N., Last Rate: 100 mL/hr at 06/29/22 2141, New Bag at 06/29/22 2141  •  dexmedetomidine (Precedex) 400 mcg/100mL D5W premix infusion, 0.1-1.5 mcg/kg/hr, Intravenous, Continuous, Sherrie Byrd, A.P.R.N., Stopped at 06/30/22 0500  •  Pharmacy Consult: Enteral tube insertion - review meds/change route/product selection, 1 Each, Other, PHARMACY TO DOSE, Sherrie Byrd, A.P.R.N.  •  norepinephrine (Levophed) 8 mg in 250 mL NS infusion (premix), 0-30 mcg/min, Intravenous, Continuous, Dimas Bonds M.D., Last Rate: 11.3 mL/hr at 06/30/22 1005, 6 mcg/min at 06/30/22 1005  •  vasopressin (VASOSTRICT) 20 Units in  mL Infusion, 0.03 Units/min, Intravenous, Continuous, Dimas Bonds M.D., Last Rate: 9 mL/hr at 06/30/22 0717, 0.03 Units/min at 06/30/22 0717  •  Respiratory Therapy Consult, , Nebulization, Continuous RT, Dimas Bonds M.D.  •  famotidine (PEPCID) tablet 20 mg, 20 mg, Enteral Tube, DAILY **OR** famotidine (PEPCID) injection 20 mg, 20 mg, Intravenous, DAILY, Dimas Bonds M.D., 20 mg at 06/30/22 0540  •  senna-docusate (PERICOLACE or SENOKOT S) 8.6-50 MG per tablet 2 Tablet, 2 Tablet, Enteral Tube, BID **AND** polyethylene glycol/lytes  "(MIRALAX) PACKET 1 Packet, 1 Packet, Enteral Tube, QDAY PRN **AND** magnesium hydroxide (MILK OF MAGNESIA) suspension 30 mL, 30 mL, Enteral Tube, QDAY PRN **AND** bisacodyl (DULCOLAX) suppository 10 mg, 10 mg, Rectal, QDAY PRN, Dimas Bonds M.D.  •  MD Alert...ICU Electrolyte Replacement per Pharmacy, , Other, PHARMACY TO DOSE, Dimas Bonds M.D.  •  lidocaine (XYLOCAINE) 1 % injection 2 mL, 2 mL, Tracheal Tube, Q30 MIN PRN, Dimas Bonds M.D.  •  fentaNYL (SUBLIMAZE) injection 25 mcg, 25 mcg, Intravenous, Q HOUR PRN **OR** fentaNYL (SUBLIMAZE) injection 50 mcg, 50 mcg, Intravenous, Q HOUR PRN **OR** fentaNYL (SUBLIMAZE) injection 100 mcg, 100 mcg, Intravenous, Q HOUR PRN, Dimas Bonds M.D., 100 mcg at 06/30/22 0138    Physical Examination:   BP (!) 99/66   Pulse 73   Temp 36.8 °C (98.2 °F) (Bladder)   Resp (!) 26   Ht 1.549 m (5' 0.98\")   Wt 57 kg (125 lb 10.6 oz)   SpO2 100%   BMI 23.76 kg/m²       General: Not interactive  Neck: There is normal range of motion  CV: Regular rate   Extremities:  Cold, mottled in all limbs    NEUROLOGICAL EXAM:     Mental status: No eye opening, non-interactive  Speech and language: Intubated, no command following  Cranial nerve exam: Pupils are equal, round and reactive to light bilaterally. No blink to threat, no tracking.  Face is symmetric.   Motor exam: No movements to noxious in all extremities  Sensory exam:  As above  Coordination: No movements, cannot be tested    Objective Data:    Labs:  Lab Results   Component Value Date/Time    PROTHROMBTM 22.2 (H) 06/30/2022 04:15 AM    INR 2.01 (H) 06/30/2022 04:15 AM      Lab Results   Component Value Date/Time    WBC 6.8 06/30/2022 04:15 AM    RBC 3.70 (L) 06/30/2022 04:15 AM    HEMOGLOBIN 11.4 (L) 06/30/2022 04:15 AM    HEMATOCRIT 33.9 (L) 06/30/2022 04:15 AM    MCV 91.6 06/30/2022 04:15 AM    MCH 30.8 06/30/2022 04:15 AM    MCHC 33.6 06/30/2022 04:15 AM    MPV 12.9 06/30/2022 04:15 AM    NEUTSPOLYS 74.10 (H) " 06/30/2022 04:15 AM    LYMPHOCYTES 11.20 (L) 06/30/2022 04:15 AM    MONOCYTES 6.00 06/30/2022 04:15 AM    EOSINOPHILS 0.00 06/30/2022 04:15 AM    BASOPHILS 0.00 06/30/2022 04:15 AM    ANISOCYTOSIS 1+ 06/30/2022 04:15 AM      Lab Results   Component Value Date/Time    SODIUM 140 06/30/2022 04:15 AM    POTASSIUM 4.6 06/30/2022 04:15 AM    CHLORIDE 102 06/30/2022 04:15 AM    CO2 18 (L) 06/30/2022 04:15 AM    GLUCOSE 164 (H) 06/30/2022 04:15 AM    BUN 53 (H) 06/30/2022 04:15 AM    CREATININE 2.84 (H) 06/30/2022 04:15 AM    CREATININE 0.8 01/10/2009 04:30 AM      Lab Results   Component Value Date/Time    CHOLSTRLTOT 204 (H) 07/30/2016 04:15 AM     (H) 07/30/2016 04:15 AM    HDL 31 (A) 07/30/2016 04:15 AM    TRIGLYCERIDE 81 06/28/2022 07:50 PM       Lab Results   Component Value Date/Time    ALKPHOSPHAT 116 (H) 06/30/2022 04:15 AM    ASTSGOT 3462 (HH) 06/30/2022 04:15 AM    ALTSGPT 2424 (HH) 06/30/2022 04:15 AM    TBILIRUBIN 0.9 06/30/2022 04:15 AM        Imaging/Testing:    I interpreted and/or reviewed the patient's neuroimaging    DX-CHEST-PORTABLE (1 VIEW)   Final Result         1.  Pulmonary edema and/or infiltrates are identified, which are stable since the prior exam.   2.  Cardiomegaly   3.  Atherosclerosis      EC-ECHOCARDIOGRAM COMPLETE W/O CONT   Final Result      DX-ABDOMEN FOR TUBE PLACEMENT   Final Result      The Cortrak feeding tube placed in the interval terminates over the fundus of the stomach.      US-RENAL   Final Result      1. Normal sonographic evaluation of the kidneys. No stones or hydronephrosis.   2. The bladder is decompressed around a Lowery catheter.      OUTSIDE IMAGES-DX CHEST   Final Result      OUTSIDE IMAGES-CT HEAD   Final Result      OUTSIDE IMAGES-CT CERVICAL SPINE   Final Result      OUTSIDE IMAGES-CT CHEST   Final Result      MR-MRA HEAD-W/O   Final Result         MRA OF THE Native OF COULTER WITHIN NORMAL LIMITS.      MR-BRAIN-WITH & W/O   Final Result      1.  Acute small  "1.7 x 1.5 cm intraparenchymal hemorrhage in the right frontal juxta cortical white matter.      2.  Curvilinear subarachnoid hemorrhage noted in the right temporal sulci..      3.  Innumerable tiny chronic \"microbleed\" throughout the brain parenchyma. All of the above findings are suspicious for Amyloid Angiopathy.      4.  Age-related cortical atrophy.      5.  Extensive periventricular white matter changes consistent with chronic microvascular ischemic disease.      DX-CHEST-LIMITED (1 VIEW)   Final Result         1.  Pulmonary edema and/or infiltrates.   2.  Cardiomegaly   3.  Atherosclerosis      MR-CERVICAL SPINE-W/O    (Results Pending)   MR-THORACIC SPINE-W/O    (Results Pending)   MR-LUMBAR SPINE-W/O    (Results Pending)       Assessment and Plan:  Candelaria Murphy is a 71 year old woman, found down with poor GCS, requiring intubation, with shock physiology and found to have multifocal acute brain hemorrhages.  Her initial neurologic exam (essentially comatose) was out of proportion to her hemorrhage burden, and more suggestive of global cerebral dysfunction.  CSF studies suggestive of florid inflammation, and infectious CSF panel was negative including HSV PCR. MRI brain with additional acute findings- including multifocal, punctate ischemic strokes, innumerable cerebral microbleeds, and diffuse leukodystrophy.    Given her bacteremia- from neurologic perspective- the clinical picture is that her MRI findings of microbleeds, diffuse leukodystrophy are likely chronic findings- related to her long-standing smoking, and likely cerebral amyloid angiopathy.  The nicole, acute intracranial hemorrhage, and ischemic strokes are related to her underlying septic shock- and reflects likely worsening coagulopathy and likely septic emboli.    At this time, with her clinical trajectory of worsening end-organ dysfunction, she has poor prognosis to make meaningful neurologic recovery.    Problem list:  1.  Coma  2.  " Multifocal brain hemorrhages  3.  Leukoencephalitis, NOS    Plan:   - q4h neurochecks, limit sedation to follow exam   - antibiotics, antivirals per primary service   - remains on vasopressors- SBP goal ideally    - may discontinue keppra therapy- given poor renal function, may be excessively sedating, and this was not a seizure event   - no neurologic indication for the use of steroids or other immunomodulating therapies at this time   - no antithrombotic therapy, SCDs only   - ongoing GOC discussion, neurologic prognosis for meaningful recovery is poor   - please reconsult Neurology with any additional questions or concerns    The evaluation of the patient, and recommended management, was discussed with the ICU staff. I have performed a physical exam and reviewed and updated ROS and Plan today (6/30/2022).     Jovanni Connelly MD  Neurohospitalist, Acute Care Services

## 2022-06-30 NOTE — PROGRESS NOTES
Infectious Disease Progress Note    Author: Tiffanie Girard M.D. Date & Time of service: 2022  10:17 AM    Chief Complaint:  MRSA septic shock    Interval History:  71 y.o. female admitted 2022 after being found down by her .Per  she had gone to her PCP multiple times over the past couple weeks for abdominal pain. Now in shock and multiorgan failure   AF WBC 6.8 CSF cx neg Blood cxs X 2 MRSA worsening neuro status noted-flaccid extremities. Intubated and sedated on pressors    Labs Reviewed, Medications Reviewed, Radiology Reviewed and Wound Reviewed.    Review of Systems:  Review of Systems   Unable to perform ROS: Intubated   Constitutional: Negative for fever.       Hemodynamics:  Temp (24hrs), Av.9 °C (98.4 °F), Min:36.8 °C (98.2 °F), Max:37 °C (98.6 °F)  Temperature: 36.8 °C (98.2 °F), Monitored Temp: 37.4 °C (99.32 °F)  Pulse  Av.1  Min: 33  Max: 109   Blood Pressure : (!) 99/66, Arterial BP: 150/64       Physical Exam:  Physical Exam  Vitals and nursing note reviewed.   Constitutional:       Appearance: She is ill-appearing. She is not diaphoretic.   HENT:      Nose: No rhinorrhea.   Eyes:      General: No scleral icterus.        Right eye: No discharge.         Left eye: No discharge.   Cardiovascular:      Rate and Rhythm: Normal rate.      Heart sounds: Murmur heard.   Pulmonary:      Effort: Pulmonary effort is normal. No respiratory distress.      Breath sounds: No stridor. No wheezing or rhonchi.   Abdominal:      General: There is distension.   Musculoskeletal:      Cervical back: Neck supple. No rigidity.   Skin:     Coloration: Skin is not jaundiced.      Findings: Bruising present.      Comments: cyanotic   Neurological:      Comments: sedated         Meds:    Current Facility-Administered Medications:   •  sodium bicarbonate 150 meq in D5W 1000 mL  •  dexmedetomidine (Precedex) infusion  •  Pharmacy  •  norepinephrine (Levophed) infusion  •  vasopressin  (PITRESSIN) infusion  •  Respiratory Therapy Consult  •  famotidine **OR** famotidine  •  senna-docusate **AND** polyethylene glycol/lytes **AND** magnesium hydroxide **AND** bisacodyl  •  MD Alert...Adult ICU Electrolyte Replacement per Pharmacy  •  lidocaine  •  fentaNYL **OR** fentaNYL **OR** fentaNYL    Labs:  Recent Labs     06/29/22  0942 06/29/22 2020 06/30/22 0415   WBC 4.7* 5.5 6.8   RBC 3.72* 3.47* 3.70*   HEMOGLOBIN 11.7* 10.8* 11.4*   HEMATOCRIT 34.6* 32.4* 33.9*   MCV 93.0 93.4 91.6   MCH 31.5 31.1 30.8   RDW 52.6* 53.0* 51.0*   PLATELETCT 59* 95* 74*   MPV 13.2* 12.1 12.9   NEUTSPOLYS 59.60 64.30 74.10*   LYMPHOCYTES 5.30* 11.30* 11.20*   MONOCYTES 0.90 0.90 6.00   EOSINOPHILS 0.00 0.00 0.00   BASOPHILS 0.00 0.00 0.00   RBCMORPHOLO Present Present Present     Recent Labs     06/29/22  0942 06/29/22  1410 06/29/22  1645 06/29/22 2020 06/30/22 0415   SODIUM 140  --   --  141 140   POTASSIUM 4.4  --   --  4.5 4.6   CHLORIDE 106  --   --  103 102   CO2 17*  --   --  17* 18*   GLUCOSE 117*  --   --  177* 164*   BUN 43*  --   --  49* 53*   CPKTOTAL 2890*   < > 3449* 3377* 4548*    < > = values in this interval not displayed.     Recent Labs     06/29/22  0430 06/29/22  0942 06/29/22 2020 06/30/22 0415   ALBUMIN 2.6* 2.4*  --  2.1*   TBILIRUBIN 1.0 0.8  --  0.9   ALKPHOSPHAT 52 62  --  116*   TOTPROTEIN 4.9* 4.6*  --  4.5*   ALTSGPT 705* 3051*  --  2424*   ASTSGOT 883* 4392*  --  3462*   CREATININE 1.95* 2.17* 2.76* 2.84*       Imaging:  DX-CHEST-LIMITED (1 VIEW)    Result Date: 6/28/2022 6/28/2022 7:47 PM HISTORY/REASON FOR EXAM: Central line TECHNIQUE/EXAM DESCRIPTION:  Single AP view of the chest. COMPARISON: July 29, 2016 FINDINGS: Right internal jugular central line is seen terminating at the right atriocaval junction.  Endotracheal tube terminates between the clavicles and shaji.   Cardiomegaly is observed. Atherosclerotic calcification of the aorta is noted.  The central  pulmonary  vasculature appears prominent and indistinct. The lungs appear well expanded bilaterally.  Diffuse scattered hazy pulmonary parenchymal opacities are seen. No significant pleural effusions are identified. The bony structures appear age-appropriate.     1.  Pulmonary edema and/or infiltrates. 2.  Cardiomegaly 3.  Atherosclerosis    DX-CHEST-PORTABLE (1 VIEW)    Result Date: 6/30/2022 6/30/2022 2:08 AM HISTORY/REASON FOR EXAM: For indication of respiratory failure; For indication of respiratory failure TECHNIQUE/EXAM DESCRIPTION:  Single AP view of the chest. COMPARISON: June 28, 2022 FINDINGS: Dobbhoff tube has been placed in the interim, terminating below the lower margin of the film within the abdomen.  Otherwise medical device position is stable. Cardiomegaly is observed. Atherosclerotic calcification of the aorta is noted.  The central  pulmonary vasculature appears prominent and indistinct. Bilateral lung volumes are diminished.  Diffuse scattered hazy pulmonary parenchymal opacities are seen. No significant pleural effusions are identified. The bony structures appear age-appropriate.     1.  Pulmonary edema and/or infiltrates are identified, which are stable since the prior exam. 2.  Cardiomegaly 3.  Atherosclerosis    MR-BRAIN-WITH & W/O    Result Date: 6/29/2022 6/28/2022 8:38 PM HISTORY/REASON FOR EXAM:  Meningitis/CNS infection suspected. TECHNIQUE/EXAM DESCRIPTION:   MRI of the brain without and with contrast. T1 sagittal, T2 fast spin-echo axial, T1 coronal, FLAIR coronal, diffusion-weighted and apparent diffusion coefficient (ADC map) axial images were obtained of the whole brain. T1 postcontrast axial and T1 postcontrast coronal images were obtained. The study was performed on a [employer] Signa 1.5 Nu MRI scanner. 10 mL ProHance contrast was administered intravenously. COMPARISON:  Head CT 6/28/2022 FINDINGS: Again redemonstrated is a 1.7 x 1.5 cm ovoid region of isointense T1 and heterogeneous  "increased and decreased T2 signal intensity in the right frontal juxtacortical white matter. There are also numerous small punctate and ovoid areas of decreased gradient echo signal intensity in the periventricular and juxtacortical white matter and throughout the cerebellar white matter.. There is a curvilinear region of decreased gradient echo signal intensity increased T2 signal intensity in the right lateral  temporal gyrus. There is extensive confluent areas of increased T2 signal intensity throughout the periventricular white matter. The calvariae are unremarkable. There are no extra-axial fluid collections. The ventricular system and basal cisterns are mild to moderately prominent. There is mild-to-moderate prominence of the cortical sulci and gyri.  There are no mass effects or shift of midline structures. The diffusion-weighted axial images show no evidence of acute cerebral infarction. The postcontrast images show no areas of abnormal parenchymal or meningeal enhancement. The brainstem and posterior fossa structures are unremarkable. Vascular flow voids in the vertebrobasilar and carotid arteries, [place], and dural venous sinuses are intact. The paranasal sinuses and mastoids in the field of view are unremarkable.     1.  Acute small 1.7 x 1.5 cm intraparenchymal hemorrhage in the right frontal juxta cortical white matter. 2.  Curvilinear subarachnoid hemorrhage noted in the right temporal sulci.. 3.  Innumerable tiny chronic \"microbleed\" throughout the brain parenchyma. All of the above findings are suspicious for Amyloid Angiopathy. 4.  Age-related cortical atrophy. 5.  Extensive periventricular white matter changes consistent with chronic microvascular ischemic disease.    MR-MRA HEAD-W/O    Result Date: 6/29/2022 6/28/2022 8:38 PM HISTORY/REASON FOR EXAM:  Subarachnoid hemorrhage (SAH) suspected TECHNIQUE/EXAM DESCRIPTION AND NUMBER OF VIEWS:  MRA of the [place] without contrast. " The study was performed on a [employer] Signa 1.5 Nu MRI scanner.  MRA of the [place] and the vertebrobasilar junction was performed with 3D time-of-flight technique. FINDINGS:     MRA of the [place] shows no aneurysm or occlusive disease in the anterior circulation.  The posterior circulation shows the vertebrobasilar confluence to be intact.  There is no aneurysm or occlusive lesion.     MRA OF THE [place] WITHIN NORMAL LIMITS.    US-RENAL    Result Date: 6/29/2022 6/29/2022 8:42 AM HISTORY/REASON FOR EXAM:  Abnormal Labs; Ventilator, shock, low urine output TECHNIQUE/EXAM DESCRIPTION: Renal ultrasound. COMPARISON:  CT of the chest, abdomen and pelvis performed on 6/28/2022. FINDINGS: The right kidney measures 8.5 cm.  The right kidney appears normal in contour and parenchymal echotexture. The corticomedullary differentiation is preserved. The right renal collecting system is not dilated. No hydronephrosis. There are no renal calculi. The left kidney measures 11.16 cm. The left kidney appears normal in contour and parenchymal echotexture. The corticomedullary differentiation is preserved. The left renal collecting system is not dilated. No hydronephrosis. There are no renal calculi. The bladder is decompressed around a Lowery catheter.     1. Normal sonographic evaluation of the kidneys. No stones or hydronephrosis. 2. The bladder is decompressed around a Lowery catheter.    EC-ECHOCARDIOGRAM COMPLETE W/O CONT    Result Date: 6/29/2022  Transthoracic Echo Report Echocardiography Laboratory CONCLUSIONS No prior study is available for comparison. Mild concentric left ventricular hypertrophy. Moderately reduced left ventricular systolic function. The left ventricular ejection fraction is estimated to be 35%. Global hypokinesis. Moderate mitral annular calcification. Moderate aortic valve stenosis. Transvalvular gradients are - Peak: 38.51 mmHg, Mean 23.42  mmHg. Vmax is 3.10  m/s. Moderate  aortic insufficiency. Estimated right ventricular systolic pressure is 29  mmHg. ROSHNI PERKINS Exam Date:         2022                    07:27 Exam Location:     Inpatient Priority:          Routine Ordering Physician:        ROMAIN AKINS Referring Physician:       TASHI Andujar Sonographer:               [place] CHE Age:    71     Gender:    F MRN:    [id number] :    1950 BSA:    1.53   Ht (in):    60     Wt (lb):    125 Exam Type:     Complete Indications:     CVA ICD Codes:       436 CPT Codes:       00756 BP:   101    /   55     HR:   88 Technical Quality:       Fair MEASUREMENTS  (Male / Female) Normal Values 2D ECHO LV Diastolic Diameter PLAX        4.9 cm                4.2 - 5.9 / 3.9 - 5.3 cm LV Systolic Diameter PLAX         4.1 cm                2.1 - 4.0 cm IVS Diastolic Thickness           1.1 cm                LVPW Diastolic Thickness          0.77 cm               LVOT Diameter                     1.8 cm                Estimated LV Ejection Fraction    35 %                  LV Ejection Fraction MOD BP       35.4 %                >= 55  % LV Ejection Fraction MOD 4C       30.8 %                LV Ejection Fraction MOD 2C       36.9 %                IVC Diameter                      1.2 cm                DOPPLER AV Peak Velocity                  3 m/s                 AV Peak Gradient                  36.6 mmHg             AV Mean Gradient                  22.5 mmHg             AI Pressure Half Time             434 ms                LVOT Peak Velocity                0.88 m/s              AV Area Cont Eq vti               0.64 cm2              MV Velocity Time Integral         35.8 cm               MV Pressure Half Time             75 ms                 MV Area PHT                       2.9 cm2               TR Peak Velocity                  252 cm/s              * Indicates values subject to auto-interpretation LV EF:  35    % FINDINGS Left Ventricle The left ventricle is  moderately dilated. Mild concentric left ventricular hypertrophy. Moderately reduced left ventricular systolic function. The left ventricular ejection fraction is visually estimated to be 35%. Global hypokinesis. Grade II diastolic dysfunction. Right Ventricle Normal right ventricular size and systolic function. Right Atrium Normal right atrial size. Normal inferior vena cava size and inspiratory collapse. Left Atrium Mildly dilated left atrium. Mitral Valve Moderate mitral annular calcification. No mitral stenosis. Mild mitral regurgitation. Mean transvalvular gradient is 4.16  mmHg at a heart rate of  BPM. Aortic Valve Calcified aortic valve leaflets. Moderate aortic valve stenosis. Moderate aortic insufficiency. Transvalvular gradients are - Peak: 38.51 mmHg, Mean 23.42  mmHg. Vmax is 3.10  m/s. Tricuspid Valve No stenosis or regurgitation seen. No tricuspid stenosis. Mild tricuspid regurgitation. Estimated right ventricular systolic pressure is 29  mmHg. Pulmonic Valve Structurally normal pulmonic valve without significant stenosis or regurgitation. Pericardium No pericardial effusion. Aorta Normal aortic root for body surface area. Tito Almodovar MD (Electronically Signed) Final Date:     29 June 2022                 09:31 Amended:        29 June 2022 13:33    DX-ABDOMEN FOR TUBE PLACEMENT    Result Date: 6/29/2022 6/29/2022 11:11 AM HISTORY/REASON FOR EXAM:  Feeding tube placement. TECHNIQUE/EXAM DESCRIPTION AND NUMBER OF VIEWS:  1 view(s) of the abdomen. COMPARISON:  CT of the chest, abdomen and pelvis performed on 6/28/2022 FINDINGS: Enteric tube has been placed. The tip projects over the fundus of the stomach. The bowel gas pattern is within normal limits. The endotracheal tube terminates 2.1 cm above the shaji. The right internal jugular central venous access catheter terminates over the superior vena cava.     The Cortrak feeding tube placed in the interval terminates over the fundus of the  "stomach.      Micro:  Results     Procedure Component Value Units Date/Time    BLOOD CULTURE [[id number]]     Order Status: Sent Specimen: Blood from Peripheral     BLOOD CULTURE [[id number]]     Order Status: Sent Specimen: Blood from Peripheral     BLOOD CULTURE [[id number]]  (Abnormal) Collected: 06/28/22 2130    Order Status: Completed Specimen: Blood from Peripheral Updated: 06/30/22 1001     Significant Indicator POS     Source BLD     Site PERIPHERAL     Culture Result Growth detected by Bactec instrument. 06/30/2022  01:57      Methicillin Resistant Staphylococcus aureus    Narrative:      CALL  Aguirre  [facility] tel. [phone],  CALLED  [facility] tel. [phone] 06/30/2022, 02:00, RB PERF. RESULTS CALLED TO: RN  [id number]  Per Hospital Policy: Only change Specimen Src: to \"Line\" if  specified by physician order.  No site indicated    BLOOD CULTURE [[id number]]  (Abnormal) Collected: 06/28/22 2130    Order Status: Completed Specimen: Blood from Peripheral Updated: 06/30/22 0954     Significant Indicator POS     Source BLD     Site PERIPHERAL     Culture Result Growth detected by Bactec instrument. 06/30/2022  00:17  Methicillin Resistant Staphylococcus aureus (MRSA)  detected by PCR.  Susceptibility to follow.        Staphylococcus aureus  Susceptibilities in progress      Narrative:      CALL  Aguirre  [facility] tel. [phone],  CALLED  [facility] tel. [phone] 06/30/2022, 00:20, RB PERF. RESULTS CALLED TO: RN  [phone]   Per Hospital Policy: Only change Specimen Src: to \"Line\" if  specified by physician order.  No site indicated    COV-2, FLU A/B, AND RSV BY PCR (2-4 HOURS CEPHEID): Collect NP swab in [place] [[id number]] Collected: 06/29/22 1538    Order Status: Completed Specimen: Respirate from Nasopharyngeal Updated: 06/29/22 1644     Influenza virus A RNA Negative     Influenza virus B, PCR Negative     RSV, PCR Negative     SARS-CoV-2 by PCR NotDetected     Comment: PATIENTS: Important information regarding your results and instructions " "can  be found at [email or web address]   \"After your  Covid-19 Test\"    [facility] providers: PLEASE REFER TO DE-ESCALATION AND RETESTING PROTOCOL  on [email or web address]    **The [employer] GeneXpert Xpress SARS-CoV-2 RT-PCR Test has been made  available for use under the Emergency Use Authorization (EUA) only.          SARS-CoV-2 Source NP Swab    Narrative:      Collected By: [id number] JOSE DE JESUS KINNEY    CSF CULTURE [[id number]] Collected: 06/28/22 1955    Order Status: Completed Specimen: CSF from Tap Updated: 06/29/22 1524     Significant Indicator NEG     Source CSF     Site TAP     Culture Result No growth at 24 hours.     Gram Stain Result Rare WBCs.  No organisms seen.      Narrative:      Collected By: [id number] GREEN AUGUST  Collected By: [id number] GREEN AUGUST    Blood Culture,Hold [[id number]] Collected: 06/28/22 2130    Order Status: Completed Updated: 06/29/22 0536     Blood Culture Hold Collected    Blood Culture,Hold [[id number]] Collected: 06/28/22 0535    Order Status: Completed Updated: 06/29/22 0535     Blood Culture Hold Collected    BLOOD CULTURE [[id number]]     Order Status: Canceled Specimen: Blood from Peripheral     GRAM STAIN [[id number]] Collected: 06/28/22 1955    Order Status: Completed Specimen: CSF Updated: 06/28/22 2123     Significant Indicator .     Source CSF     Site TAP     Gram Stain Result Rare WBCs.  No organisms seen.      Narrative:      Collected By: [id number] GREEN AUGUST  Collected By: [id number] GREEN AUGUST          Assessment:  Active Hospital Problems    Diagnosis    • *Intraparenchymal hemorrhage of brain (HCC) [I61.9]    • MRSA bacteremia [R78.81, B95.62]    • Acute encephalopathy [G93.40]    • Elevated INR [R79.1]    • Rhabdomyolysis [M62.82]    • Chronic prescription benzodiazepine use [Z79.899]    • Elevated transaminase level [R74.01]    • Thrombocytopenia (HCC) [D69.6]    • Septic shock (HCC) [A41.9, R65.21]    • Acute respiratory failure with hypoxia (HCC) " [J96.01]    • Elevated troponin [R77.8]    • Chronic, continuous use of opioids [F11.90]    • Chronic pain syndrome [G89.4]      Multifocal intraparenchymal hemorrhage with small right frontal IPH and by hemispheric SAH   Likely seizure  Shock-neurogenic+distributive +septic-concerning that she is not mounting fever or leukocytosis  Chronic thrombocytopenia-likely secondary to Hep C  Shock liver  Resp failure/vent  Myocardial ischemia  Lactic acidosis  Rhabdomyolysis  RADHA  Tobacco 1ppd  Hep C     PLAN:   Consider DC vanco as worsening renal function  Consider daptomycin as no pneumonia-CXR most c/w pulm edema  Meningitis panel neg-low suspicion as cause of ICH  Repeat Bcxs every 48 hours until neg  TTE abnormal;recommend NICOLAS as soon as feasible.    MRI C/T/L spine ordered  HIV neg  Hep panel neg Hep A and B  Not immunosuppressed-doubt CMV  Anticipate 6 weeks IV abx from date of negative blood cxs    PICC:TBD  Prognosis poor    DW Dr Frost

## 2022-06-30 NOTE — ASSESSMENT & PLAN NOTE
-due to liver failure  -INR stable, trending down  -monitor for bleeding  -holding off on vitamin K/FFP given stable bleed on imaging

## 2022-06-30 NOTE — ASSESSMENT & PLAN NOTE
-Unknown downtime.  Suspect 8 to 12 hours  -CPK 4000s, stop trending  -Status post IV fluid resuscitation

## 2022-06-30 NOTE — PROGRESS NOTES
MD lung at bedside assessing pt after change in neurological status. Pt confirmed to have positive cough, gag, corneal. No longer is withdrawing to pain, flaccid in all extremities. Orders received to continue to monitor at this time.

## 2022-06-30 NOTE — ASSESSMENT & PLAN NOTE
-Suspect multifactorial due to septic shock, found down/rhabdomyolysis  -Avoid nephrotoxins  -Renally dose medications as indicated  -Awaiting arrival of family to discuss plan of care.  Consider nephrology consult

## 2022-06-30 NOTE — ASSESSMENT & PLAN NOTE
-Blood cultures x2 6/28 positive  -Repeat blood cultures today  -Antibiotics per ID recommendation  -MRI C-spine T-spine L-spine today  -Isolation

## 2022-06-30 NOTE — PROGRESS NOTES
Pharmacy Vancomycin Kinetics Note for 6/30/2022     71 y.o. female on Vancomycin day # 1     Vancomycin Indication (Two level/Trough based Dosing): Bacteremia (goal trough 15-20)    Provider specified end date: 07/14/22    Active Antibiotics (From admission, onward)    Ordered     Ordering Provider       Thu Jun 30, 2022 12:45 AM    06/30/22 0045  MD Alert...Vancomycin per Pharmacy  PHARMACY TO DOSE        Note to Pharmacy: MRSA positive on blood cultures   Question:  Indication(s) for vancomycin?  Answer:  Staphylococcus aureus bacteremia    Rodo Story M.D.       Thu Jun 30, 2022 12:42 AM    06/30/22 0042  vancomycin (VANCOCIN) 750 mg in  mL IVPB  (vancomycin (VANCOCIN) IV (LD + Maintenance))  ONCE         Rodo Story M.D.       Wed Jun 29, 2022 11:08 AM    06/29/22 1108  acyclovir (ZOVIRAX) 478 mg in  mL IVPB  EVERY 24 HOURS         Sidney Frost M.D.       Tue Jun 28, 2022  7:36 PM    06/28/22 1936  cefTRIAXone (Rocephin) syringe 2 g  EVERY 12 HOURS         Jovanni Connelly M.D.       Tue Jun 28, 2022  7:35 PM    06/28/22 1935  ampicillin (Omnipen) 2,000 mg in  mL IVPB  EVERY 6 HOURS         Jovanni Connelly M.D.          Dosing Weight: 57 kg (125 lb 10.6 oz)      Admission History: Admitted on 6/28/2022 for Intraparenchymal hemorrhage of brain (HCC) [I61.9]  Intraparenchymal hemorrhage of brain (HCC) [I61.9]  Pertinent history: Patient with MRSA bacteremia. Vanco re-initiated.    Allergies:     No known drug allergy     Pertinent cultures to date:     Results     Procedure Component Value Units Date/Time    BLOOD CULTURE [[id number]]  (Abnormal) Collected: 06/28/22 2130    Order Status: Completed Specimen: Blood from Peripheral Updated: 06/30/22 0020     Significant Indicator POS     Source BLD     Site PERIPHERAL     Culture Result Growth detected by Bactec instrument. 06/30/2022  00:17  Gram Stain: Gram positive cocci: Possible Staphylococcus sp.  Methicillin Resistant Staphylococcus aureus  "(MRSA)  detected by PCR.  Susceptibility to follow.      Narrative:      CALL  [facility]  [facility] tel. [phone],  CALLED  [facility] tel. [phone] 06/30/2022, 00:20, RB PERF. RESULTS CALLED TO: RN  [id number]   Per Hospital Policy: Only change Specimen Src: to \"Line\" if  specified by physician order.  No site indicated    BLOOD CULTURE [[id number]] Collected: 06/28/22 2130    Order Status: Sent Specimen: Blood from Peripheral Updated: 06/29/22 1806    Narrative:      Per Hospital Policy: Only change Specimen Src: to \"Line\" if  specified by physician order.    COV-2, FLU A/B, AND RSV BY PCR (2-4 HOURS CEPKrossoverID): Collect NP swab in VTM [[id number]] Collected: 06/29/22 1538    Order Status: Completed Specimen: Respirate from Nasopharyngeal Updated: 06/29/22 1644     Influenza virus A RNA Negative     Influenza virus B, PCR Negative     RSV, PCR Negative     SARS-CoV-2 by PCR NotDetected     Comment: PATIENTS: Important information regarding your results and instructions can  be found at [email or web address]   \"After your  Covid-19 Test\"    [facility] providers: PLEASE REFER TO DE-ESCALATION AND RETESTING PROTOCOL  on [email or web address]    **The [facility] GeneXpert Xpress SARS-CoV-2 RT-PCR Test has been made  available for use under the Emergency Use Authorization (EUA) only.          SARS-CoV-2 Source NP Swab    Narrative:      Collected By: [id number] JOSE DE JESUS KINNEY    CSF CULTURE [[id number]] Collected: 06/28/22 1955    Order Status: Completed Specimen: CSF from Tap Updated: 06/29/22 1524     Significant Indicator NEG     Source CSF     Site TAP     Culture Result No growth at 24 hours.     Gram Stain Result Rare WBCs.  No organisms seen.      Narrative:      Collected By: [id number] GREEN AUGUST  Collected By: [id number] GREEN AUGUST    Blood Culture,Hold [[id number]] Collected: 06/28/22 2130    Order Status: Completed Updated: 06/29/22 0536     Blood Culture Hold Collected    Blood Culture,Hold [[id number]] Collected: " "22    Order Status: Completed Updated: 22     Blood Culture Hold Collected    BLOOD CULTURE [[id number]]     Order Status: Canceled Specimen: Blood from Peripheral     GRAM STAIN [[id number]] Collected: 22    Order Status: Completed Specimen: CSF Updated: 22     Significant Indicator .     Source CSF     Site TAP     Gram Stain Result Rare WBCs.  No organisms seen.      Narrative:      Collected By: [id number] GREEN AUGUST  Collected By: [id number] GREEN AUGUST          Labs:     Estimated Creatinine Clearance: 14.1 mL/min (A) (by C-G formula based on SCr of 2.76 mg/dL (H)).  Recent Labs     22   WBC 7.3 4.7* 5.5   NEUTSPOLYS 68.70 59.60 64.30   BANDSSTABS 20.90* 21.90* 23.50*     Recent Labs     22   BUN 31* 38* 43* 49*   CREATININE 1.11 1.95* 2.17* 2.76*   ALBUMIN 2.6* 2.6* 2.4*  --        Intake/Output Summary (Last 24 hours) at 2022 0047  Last data filed at 2022 2300  Gross per 24 hour   Intake 5194.26 ml   Output 130 ml   Net 5064.26 ml      /81   Pulse (!) 57   Temp 36.8 °C (98.2 °F) (Bladder)   Resp (!) 26   Ht 1.549 m (5' 0.98\")   Wt 57 kg (125 lb 10.6 oz)   SpO2 100%  Temp (24hrs), Av.9 °C (98.4 °F), Min:36.8 °C (98.2 °F), Max:37 °C (98.6 °F)      List concerns for Vancomycin clearance:     Age;Abnormal LFTs;Malnutrition/Low albumin;Hypermetabolic State (SIRS);Pressors/Hypotension;Nephrotoxic drugs;BUN/Scr ratio greater than 20:1;RADHA    Pharmacokinetics:     Trough kinetics:   Recent Labs     22   VANCORANDOM 23.1       A/P:     -  Vancomycin dose: pulse dose    -  Next vancomycin level(s):    -Vr @ 0100 on  (24-hour level)    -  Comments: Vanco re-started for MRSA bacteremia. Significant concerns for accumulation, given Hypotension requiring pressors, worsening RADHA, minimal UOP, shock liver, low albumin and age. Giving 15 mg/kg " dose x 1 with plans for random 24 hour level as above. Pharmacy to continue dosing based on levels.     Amaris GradyD

## 2022-06-30 NOTE — CARE PLAN
Problem: Ventilation  Goal: Ability to achieve and maintain unassisted ventilation or tolerate decreased levels of ventilator support  Description: Target End Date:  4 days     Document on Vent flowsheet    1.  Support and monitor invasive and noninvasive mechanical ventilation  2.  Monitor ventilator weaning response  3.  Perform ventilator associated pneumonia prevention interventions  4.  Manage ventilation therapy by monitoring diagnostic test results  Outcome: Not Met      Ventilator Daily Summary  18/340/+8/30%  Vent Day #2    Ventilator settings changed this shift: MD changed VT to 340    Weaning trials: No    Respiratory Procedures: No    Plan: Continue current ventilator settings and wean mechanical ventilation as tolerated per physician orders.

## 2022-06-30 NOTE — CONSULTS
Reason for Palliative Care Consult: Advance Care Planning (ACP)    Consulted by: NICK Smith    HPI: Candelaria Murphy is a 71 y.o. female who was transferred 6/28/2022 from [facility], presenting to outside hospital (OSH) after being found down, obtunded about 15:15 when spouse returned form work.  Last seen normal when he left for work in the morning.  GCS 6-7 with posturing at OSH, where she was urgently intubated. Head CT revealed a relatively small R frontal intraparenchymal hemorrhage, and bilateral parietal, cortically-based SAH, with extensive white matter disease.  Received Keppra load, multiple fluid boluses and ceftriaxone at OSH.  Noted to have troponin elevation, leukocytosis and a lactic acid of 3  Blood cultures obtained.    Patient transferred to [facility] for higher level of care with neurology and neurosurgical support. On arrival, intensivist placed central venous catheter, arterial catheter and performed lumbar puncture to rule out encephalitis, meningitis.  Patient noted to have shock physiology, received additional crystalloid, norepinephrine and vasopressin for hypotension; started on broad spectrum antibiotics and admitted to RICU.  Neurology consulted, unclear etiology of her brain hemorrhages- most concerning is angioinvasive infectious process such as HSV encephalitis.  Now with shock liver, RADHA.    Past medical/surgical history:   Past Medical History:   Diagnosis Date   • Bell's palsy    • COPD    • Infectious disease     Hepatitis C   • Psychiatric problem     depression     Additional consults:   Critical Care  Infectious Disease  Neurology  Nutrition (Dietary)    Assessment:  Neuro: Patient comatose; sedated on ventilator  Dyspnea: Yes- 100% on ventilator at 30% FiO2  Last BM:  (PTA)-    Pain: Unable to determine-    Depression: Unable to determine-    Dementia: Unable to Determine;       Living situation & psychosocial: Patient is disabled, , and lives  "with her second  Rodo in [place], [place].  She has 2 sons and a daughter from previous marriages.  Son Rodo Ny and his wife Kike live in [place].  Daughter Ena lives in [place].  She is estranged from her second son, who currently lives in \"a correction house,\" per Ena.  She has 5 grandchildren.  Patient has a younger brother and sister still living.    Spiritual:  Is Sabianism or spirituality important for coping with this illness? Unable to determine- \"None\" per face sheet  Has a  or spiritual provider visit been requested? Unable to determine    Palliative Performance Scale: 20%    Advance Directive: None-    DPOA: No-    POLST: No-      To locate and/or review the AD/POLST, please hover the cursor over the patient's code status, then scroll down under Documents to access ACP document links.    Code Status: DNR- I okay    Discussion:  Called  Simon ([phone]). Introduced myself and explained the role of Palliative Care (PC). He affirms that he and patient have been legally  since 2009 (Simon listed as \"friend,\" not spouse, under emergency contacts section in EMR).    I shared intensivist had requested our team take some time to speak with him about patient's health situation and discuss how to proceed forward with treatment.  I shared that Neurologist, Dr. Connelly, had just exited patient's room after having conversation with patient's daughter, Ena.  I compassionately shared with Simon that Dr. Connelly is recommending patient's code status be DNR/DNI (allow for natural death), due to Candelaria's extensive brain damage, uncontrolled sepsis, multi-organ failure, and extremely poor prognosis.  When I asked Simon's permission to change Candelaria's code status from Full Code to DNR/I okay, he said, \"Yes.  I agree with Ena.\"  He indicated he should have been more clear about this earlier, \"but I was overwhelmed with what was happening, and Candelaria and I never talked about this stuff.\"  I " "acknowledged it can be overwhelming and highly stressful for family members in times of acute illness.  I validated his decision and shared my perspective that it is reasonable, based on patient's poor prognosis.    He reports he just got off phone with Ena, \"who is begging me to come back to the hospital.\"  He reports he will be back in hospital in ~90 minutes.  Plan made for me to meet him in ICU for more in-depth discussion about goals of care.  Goal for POLST form completion at that time.     UPDATE 15:15:  Met with  Simon, daughter Ena at bedside.  When I asked what her understanding was of her mom's health situation after meeting with Neurologist Dr. Connelly, Ena said, \"She has MSSA in her blood stream.  Her organs are failing.  Her kidney and liver are failing.  And even if they can get the infection to go away, her organs still might not recover.\"  She reports Nephrology team had initially offered dialysis, \"but now they're not.\"  I shared my concern that even if infection is resolved and patient's organ failure improves, patient's brain bleed may have caused irreversible brain damage, meaning Candelaria would likely never return to her pre-admission baseline.  Simon echoed this stating, \"I'd give anything to have her back, but even if she were to wake up now, she wouldn't be the same person.\"          Discussed goals of care (GOC). Family agreed to DNR.  When I asked if they wished to continue current level of care, with no escalation, to give her more time, Simon said, \"I don't think she'd want that.  Why expend the energy or the resources, if it's not going to change the outcome anyway?\"  Both Simon and Ena agreed that patient would never want to be \"kept alive by machines\" or live in a nursing home.  Ena shared that her mom is \"very independent and if she woke up right now, she'd be mad that we have put her through this.  She'd want to be home with her puppy (black lab 'Bing').\"  Ena shared a " "story where patient recently left local hospital AMA, \"because she doesn't like to be in the hospital.\"  Simon shared that finding Candelaria down was somewhat traumatic for him, as it brought back memories of when he came home from work and found his second wife dead from a self-inflicted gunshot wound.  I provided therapeutic silence and empathic support.    Discussed comfort care as a treatment option.  Though tearful, Simon said, \"That would be the right thing to do.\"  Ena agreed.  I reassured them that they do not have to make a decision right now, that they can take some time to reflect and think about it.  However, they both feel confident that comfort care is the choice Candelaria would want.  They agreed to no escalation of care.  They would like patient to remain temporarily intubated, to allow time for local family members, specifically patient's son, Rodo and his wife Kike, to come \"say goodcandicee,\" prior to extubation.  I advised family to let bedside RN know when they are ready to transition patient to comfort care.      POLST form reviewed and completed with Simon.  Simon selected A) DNR/DNI  B) Comfort-focused treatment  C) No artificial nutrition or feeding tube.  This APRN scanned POLST into EMR and returned original hot pink POLST to patient chart at bedside.     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  She had so much compassion for people and animals.  And she loved art.  She had a scholarship to an art school when she was young, but never went.\"  Patient did [employer], and worked in [employer] and oils.  Ena shared that through Candelaria, her own daughter is now very interested in art classes.  Ena verbalized she would love to have one of her mom's pieces of art.  I expressed gratitude to Ena for sharing these wonderful things about her mom.      Acknowledged these are really hard things to think about and talk about.  " "Advised me and my team are here to support them and Candelaria during this difficult time.  As Ena has 5 young children, I provided her with Child Life articles about how to communicate with children about death, and provided pediatric grief resources.  Provided family with Palliative Care contact information, and encouraged them to call with any questions/needs, or if they just need to talk. All questions answered.    Provided therapeutic communication including open-ended questions, therapeutic silence, reflective listening, normalization of feelings, empathic support, and therapeutic touch throughout encounter.      Outcome:  Changed code status from Full to DNR/I okay; no escalation of care;   Please allow time for local family members to come \"say goodbye\" prior to compassionate extubation.    Updated: NICK Kelly in-person.    Thank you for allowing Palliative Care to participate in Candelaria's care. Please call our team with questions and/or additional needs.    As appropriate, Palliative Care encourages medical team to engage in further conversation, education for patient/family at bedside regarding code status, GOC/POC.    Total visit time was 60 minutes discussing and coordinating advance care planning.     Mai Stanford, DNP, APRN, [facility]  Palliative Care Nurse Practitioner  [phone]      "

## 2022-06-30 NOTE — EEG PROGRESS NOTE
[facility] Critical Care Medicine Progress Note    Brief HPI/problem list:  71-year-old female, reportedly on ASA only with no known medical history.  Her  reported she was last seen well this morning in her normal state of health.  He went to work and when he came home he found her on the floor in the home and difficult to arouse.  GCS was 6 on arrival.    She reportedly had a rectal temperature of 38.9 and was intubated in the ED for airway protection.  CT showed a 1.5 cm intraparenchymal hemorrhage in the right frontal lobe by report.  There was trace subarachnoid hemorrhage and a distant location.  On arrival she reportedly was nonverbal would open her eyes to painful stimulus only and not follow any commands.  She was given ceftriaxone, Keppra, 2 L IV fluid.  Cultures were obtained.  Abnormal labs include a white blood cell count of 18,000, troponin markedly elevated at 11,000 (normal less than 20), no ST change on EKG, lactic acid was 3, CT chest/abdomen/pelvis without contrast was reportedly unremarkable.  Urinalysis was pending.  Blood cultures were obtained and transfer request to higher level of care with neurology and neurosurgical support. On arrival, she was hypotensive and has been started on norepinephrine.  I placed central venous catheter, arterial catheter and perform longer puncture.  She received additional crystalloid and vasopressin was added. (Dr Bonds HPI 6/28)    6/28 admit,  Found down, ICH, intubated, elevated LA 3, hypotensive -> NE, LP  6/29 VD#2, NE10, vaso 0.03, MOSF, Abnormal MRI, Lp fluid c/w infectious/inflammatory process all initial micro studies/PCR negative, ECHO pending  6/30 VD#3, NE 10, vasopressin, nearly anuric, blood cultures positive for MRSA, echo with moderate AS and EF 35 with global hypokinesis    Daily exam: Unresponsive on ventilator with sluggish brainstem reflexes, no meningeal signs, no spontaneous movements, full vent support/tachypneic, lungs fairly clear,  abdomen B9, starting to get some edema and some mottling although better    Daily data review: Blood culture x2 positive for MRSA, WBC 6.8, hemoglobin 11.4, platelet count 74 after transfusion platelets yesterday, potassium 4.6, creatinine 2.84 slight up, BUN 53 slight up, glucose 164, bicarb 18 with AG 20, calcium 6.8, AST 3462-improved, ALT 2424-improved, alk phos slightly up with bili normal, albumin 2.1, CPK 4548-increasing, lactic acid 6.3 down from peak 7.2, CXR with some cardiomegaly and increased interstitial opacities not greatly changed, renal ultrasound normal-no hydronephrosis or stones    Case reviewed with ID at length at the bedside  Case reviewed with neurology at length at the bedside  Case reviewed with nephrology at length at the bedside  Case reviewed with palliative care APRN at length    Updated daughter at great length at bedside and discussed diagnosis, treatment plan and prognosis    TEAM Rounds:  Neuro: Will not following, Prop -> DEX and off & Fent pops  HR: SR 80s no ectopy  SBP: 90-120s, NE  25 -> 10 vasopressin 0.03, bicarb drip, no change  Tmax: 102.7, WBC 6.8  GI:  NPO  UOP: Low  Lines: ET, CVC, arterial line, Lowery  Resp: V#2, 18 290 8   Vte: SCDs, contraindicated with CNS bleed  PPI/H2: Pepcid  Antibx: Ceftriaxone/ampicillin/acyclovir/vancomycin    Assessment:  Septic shock-MRSA positive blood cultures query source-rule out epidural abscess  Multifocal ICH/SAH- query underlying chronic process w/ sepsis & coagulopathic process exacerbating  Encephalitis/meningitis?  Broad differential reviewed with neurology  Query component of cardiogenic/neurogenic shock  Acute respiratory failure, intubated in part to protect airway  RADHA->ARF-multifactorial including sepsis/bacteremia/rhabdomyolysis  Cardiomyopathy with global hypokinesis EF 35% query acute on chronic  Elevated LFTs shock liver with number slight better  Elevated CPK-trending up  AGMA- stabilized with bicarb drip  Acute  encephalopathy multifactorial  Elevated troponin likely demand ischemia type, good candidate for intervention  Thrombocytopenia, acute on chronic?  Coagulopathy with normal fibrinogen  History of valvular heart disease?  Moderate aortic stenosis by echocardiogram  History of Bell's palsy  History of COPD, + TOB-no bronchospasm  History of DLD  Home PPI query GERD    Plan of care:  MRI spine looking for abscess, IR to drain if appropriate  Blood culture x2  May need NICOLAS/cardiology consultation  Actively titrating norepinephrine  Vasopressin infusion  Bicarb infusion  Replete Ca  Sinew to trend CPK & lactic acid  Serial CMP, coags-monitor hepatic synthetic function  Monitor glucose, support with dextrose-bicarb in D5W  Nephrology consultation-May need RRT next 24 hours  Continue bicarb drip with D5, DC LR infusion  PRN IV bicarb boluses as needed  Serial ABG/BMP  Volume up, start concentrating IVF  Follow-up coags/TEG/fibrinogen, may need blood products  Attempt to keep platelet count above 50 K, transfuse as needed  Keppra prophylaxis continued with multiorgan failure  IV antibiotics-cover CNS, hold Vanco with RADHA, follow levels  ID consultation noted  No steroids for now, reviewed with Neuro  Further CNS w/u per Neuro  Change to home PPI  RT protocols  Nebulizer treatments Q4  SATs, not ready for SBT's  Avoid nephrotoxins, serial BMP, monitor UO    Of care consultation ongoing, goals of care discussion throughout the afternoon ongoing    Case reviewed with , ID, neurology, [facility] APRN, RN, Charge RN, RT, Clinical pharmacist and Cardiology    Critical care time provided was 60 minutes. This excludes all separate billable procedures.     Please see [facility]/NP notes for additional documentation    Sidney Frost MD  RCC

## 2022-06-30 NOTE — PROGRESS NOTES
Miguelito from Lab called with critical result of Lactic acid at 1004. Critical lab result read back to Oswaldo ROMERO notified of critical lab result at 1010.  Critical lab result read back by Sherrie ROMERO.    Neville from Lab called with critical result of ALT, AST, Ca, CPK, and troponin at 1054. Critical lab result read back to Yolanda ROMERO notified of critical lab result at 1055.  Critical lab result read back by Sherrie ROMERO.    Miguelito from Lab called with critical result of Lactic acid at 1448. Critical lab result read back to Miguelito.   Sherrie ROMERO notified of critical lab result at 1450.  Critical lab result read back by Sherrie ROMERO.      Lexie from Lab called with critical result of CPK and Lactic Acid at 1817. Critical lab result read back to Lexie.   Dr. Story notified of critical lab result at 1818.  Critical lab result read back by Dr. Story.

## 2022-06-30 NOTE — PROGRESS NOTES
Critical Care Progress Note    Date of admission  6/28/2022    Chief Complaint  Altered mental status    Hospital Course  Candelaria Murphy is a 71 year old female with PMH significant for COPD, anxiety/depression on chronic benzodiazepine therapy, chronic pain on chronic opioid therapy who transferred here from [facility] with intraparenchymal hemorrhage. Per report, patients  returned home from work to find patient down on the floor, obtunded. On arrival to [place], GCS 6, SBP's 80, temp 102.2. Non-con head CT showed small right frontal intraparenchymal hemorrhage and bilateral cortically based SAH with extensive white matter disease. She was intubated, loaded with Keppra, given IVF bolus', and Ceftriaxone.   Patient also noted to have elevated troponin, leukocytosis, and lactic acid 3. CT chest/abdomen/pelvis reportedly without acute pathology.  En route to [facility], patient received sedation with Versed/Fentanyl and became more hypotensive requiring vasopressor support.   Lumbar puncture performed on arrival to [facility] 88 WBC, 59 protein, (-) HSV/encephalitis panel CSF.  6/29 - worsening RADHA, liver enzymes.       Interval Problem Update  Tmax 100.4  CPK 3083 -3449 -3377 -4548.  INR 2.01 (2.49),   (+) blood cultures MRSA- isolation  Dex off, not following. PERRLA.  DC'd Keppra today per neurology recommendations.  SR/SB 59-70's  SBP's 100's  NPO - Cortrack for meds, holding off on tube feedings  RIJ TLC, beltrán, artline  Levo @ 8, Vaso 0.03  I/O: 5200/135  Vent day #3 AP VC CMV 18/340/PEEP 8/30%  7.36/33/76  SAT/SBT: yes/no, does not wake for parameters  Mobility 1    Plan today: MRI C-spine/T-spine/L-spine per ID recommendations.  Awaiting , Simon, to arrive at bedside and will have discussion with patient's  and daughter regarding poor prognosis and plan of care.    Review of Systems  Review of Systems   Unable to perform ROS: Intubated        Vital Signs for last 24 hours   Temp:  [36.8 °C  (98.2 °F)-37 °C (98.6 °F)] 36.8 °C (98.2 °F)  Pulse:  [53-97] 67  Resp:  [10-41] 19  BP: ()/(43-81) 99/66  SpO2:  [92 %-100 %] 100 %    Hemodynamic parameters for last 24 hours       Respiratory Information for the last 24 hours  Vent Mode: APVCMV  Rate (breaths/min): 18  Vt Target (mL): 340  PEEP/CPAP: 8  MAP: 12  Control VTE (exp VT): 342    Physical Exam   Physical Exam  Vitals and nursing note reviewed.   Constitutional:       General: She is not in acute distress.     Appearance: Normal appearance. She is ill-appearing.      Interventions: She is intubated.   HENT:      Head: Normocephalic.      Nose: Nose normal.      Mouth/Throat:      Lips: Pink.      Mouth: Mucous membranes are dry.   Eyes:      Pupils: Pupils are equal, round, and reactive to light.   Cardiovascular:      Rate and Rhythm: Normal rate and regular rhythm.      Pulses: Decreased pulses.           Dorsalis pedis pulses are detected w/ Doppler on the right side and detected w/ Doppler on the left side.      Heart sounds: Heart sounds are distant.      Comments: BLE mottling  Pulmonary:      Effort: Pulmonary effort is normal. She is intubated.      Breath sounds: No rhonchi.   Abdominal:      General: Bowel sounds are decreased.      Palpations: Abdomen is soft.   Musculoskeletal:      Comments: No spontaneous movements  Withdraws   Skin:     Capillary Refill: Capillary refill takes 2 to 3 seconds.      Coloration: Skin is mottled.      Comments: All extremities cold, delayed cap refill   Neurological:      GCS: GCS eye subscore is 1. GCS verbal subscore is 1. GCS motor subscore is 4.      Comments: 6T    Does not open eyes, does not follow commands, no spontaneous movements   Psychiatric:      Comments: Intubated         Medications  Current Facility-Administered Medications   Medication Dose Route Frequency Provider Last Rate Last Admin   • sodium bicarbonate 150 mEq in D5W infusion (premix)   Intravenous Continuous Sherrie Byrd  A.P.R.N. 100 mL/hr at 06/29/22 2141 New Bag at 06/29/22 2141   • dexmedetomidine (Precedex) 400 mcg/100mL D5W premix infusion  0.1-1.5 mcg/kg/hr Intravenous Continuous PRABHU LundPYasmineR.N.   Stopped at 06/30/22 0500   • Pharmacy Consult: Enteral tube insertion - review meds/change route/product selection  1 Each Other PHARMACY TO DOSE PRABHU LundP.R.N.       • norepinephrine (Levophed) 8 mg in 250 mL NS infusion (premix)  0-30 mcg/min Intravenous Continuous Dimas Bonds M.D. 11.3 mL/hr at 06/30/22 1005 6 mcg/min at 06/30/22 1005   • vasopressin (VASOSTRICT) 20 Units in  mL Infusion  0.03 Units/min Intravenous Continuous Dimas Bonds M.D. 9 mL/hr at 06/30/22 0717 0.03 Units/min at 06/30/22 0717   • Respiratory Therapy Consult   Nebulization Continuous RT Dimas Bonds M.D.       • famotidine (PEPCID) tablet 20 mg  20 mg Enteral Tube DAILY Dimas Bonds M.D.        Or   • famotidine (PEPCID) injection 20 mg  20 mg Intravenous DAILY Dimas Bonds M.D.   20 mg at 06/30/22 0540   • senna-docusate (PERICOLACE or SENOKOT S) 8.6-50 MG per tablet 2 Tablet  2 Tablet Enteral Tube BID Dimas Bonds M.D.        And   • polyethylene glycol/lytes (MIRALAX) PACKET 1 Packet  1 Packet Enteral Tube QDAY PRN Dimas Bonds M.D.        And   • magnesium hydroxide (MILK OF MAGNESIA) suspension 30 mL  30 mL Enteral Tube QDAY PRN Dimas Bonds M.D.        And   • bisacodyl (DULCOLAX) suppository 10 mg  10 mg Rectal QDAY PRN Dimas Bonds M.D.       • MD Alert...ICU Electrolyte Replacement per Pharmacy   Other PHARMACY TO DOSE Dimas Bonds M.D.       • lidocaine (XYLOCAINE) 1 % injection 2 mL  2 mL Tracheal Tube Q30 MIN PRN Dimas W Vamshi, M.D.       • fentaNYL (SUBLIMAZE) injection 25 mcg  25 mcg Intravenous Q HOUR PRN Dimas Bonds M.D.        Or   • fentaNYL (SUBLIMAZE) injection 50 mcg  50 mcg Intravenous Q HOUR PRN Dimas Bonds M.D.        Or   • fentaNYL (SUBLIMAZE) injection 100 mcg  100  mcg Intravenous Q HOUR PRN Dimas Bonds M.D.   100 mcg at 06/30/22 0138       Fluids    Intake/Output Summary (Last 24 hours) at 6/30/2022 1038  Last data filed at 6/30/2022 0800  Gross per 24 hour   Intake 4842.8 ml   Output 115 ml   Net 4727.8 ml       Laboratory  Recent Labs     06/29/22  0138 06/29/22 2034 06/30/22  0215   ISTATAPH 7.337* 7.369* 7.355*   ISTATAPCO2 27.8 33.0 33.5   ISTATAPO2 95* 103* 77   ISTATATCO2 16* 20 20   [id number] 97 98 95   ISTATARTHCO3 14.9* 19.0 18.7   ISTATARTBE -10* -6* -6*   ISTATTEMP 102.0 F 97.7 F 98.5 F   ISTATFIO2 30 30 30   ISTATSPEC Arterial Arterial Arterial   ISTATAPHTC 7.310* 7.376* 7.356*   KOFWNAHZ0OA 106* 100* 76     Recent Labs     06/29/22  1645 06/29/22 2020 06/30/22  0415   CPKTOTAL 3449* 3377* 4548*     Recent Labs     06/28/22  2154 06/29/22  0430 06/29/22 0942 06/29/22 2020 06/30/22  0415   SODIUM  --  140 140 141 140   POTASSIUM  --  5.2 4.4 4.5 4.6   CHLORIDE  --  107 106 103 102   CO2  --  15* 17* 17* 18*   BUN  --  38* 43* 49* 53*   CREATININE  --  1.95* 2.17* 2.76* 2.84*   MAGNESIUM 1.5 2.8*  --   --  2.3   PHOSPHORUS 4.8* 5.7*  --   --  5.7*   CALCIUM  --  7.4* 6.8* 7.2* 6.8*     Recent Labs     06/29/22  0430 06/29/22  0942 06/29/22 2020 06/30/22  0415   ALTSGPT 705* 3051*  --  2424*   ASTSGOT 883* 4392*  --  3462*   ALKPHOSPHAT 52 62  --  116*   TBILIRUBIN 1.0 0.8  --  0.9   GLUCOSE 80 117* 177* 164*     Recent Labs     06/29/22  0430 06/29/22  0942 06/29/22 2020 06/30/22  0415   WBC 7.3 4.7* 5.5 6.8   NEUTSPOLYS 68.70 59.60 64.30 74.10*   LYMPHOCYTES 1.70* 5.30* 11.30* 11.20*   MONOCYTES 1.70 0.90 0.90 6.00   EOSINOPHILS 0.00 0.00 0.00 0.00   BASOPHILS 0.00 0.00 0.00 0.00   ASTSGOT 883* 4392*  --  3462*   ALTSGPT 705* 3051*  --  2424*   ALKPHOSPHAT 52 62  --  116*   TBILIRUBIN 1.0 0.8  --  0.9     Recent Labs     06/28/22  1950/22  0430 06/29/22  0942 06/29/22  1410 06/29/22 2020 06/30/22  0415   RBC  --    < > 3.72*  --  3.47* 3.70*    HEMOGLOBIN  --    < > 11.7*  --  10.8* 11.4*   HEMATOCRIT  --    < > 34.6*  --  32.4* 33.9*   PLATELETCT  --    < > 59*  --  95* 74*   PROTHROMBTM 19.5*  --   --  26.2*  --  22.2*   APTT  --   --   --  43.6*  --  38.6*   INR 1.70*  --   --  2.49*  --  2.01*    < > = values in this interval not displayed.       Imaging  X-Ray:  I have personally reviewed the images and compared with prior images.  Echo:   Reviewed  MRI:   Reviewed    Assessment/Plan  * Septic shock (HCC)- (present on admission)  Assessment & Plan  -This is Sepsis Present on admission  -SIRS criteria identified on my evaluation include: Fever, with temperature greater than 101 deg F, Tachycardia, with heart rate greater than 90 BPM and Leukocytosis, with WBC greater than 12,000  -Source is blood.  MRSA bacteremia  -Crystalloid Fluid Administration: Fluid resuscitation completed.  She has received total of 4.5 L   -Antibiotics per ID recommendations  -No vegetations noted on ECHO.    -ID following -plan for 6 weeks of antibiotics once blood cultures are negative  -MRI C-spine T-spine L-spine today  -Assessment 6/30 -has received sufficient IV fluid resuscitation.  Continues to require norepinephrine and vasopressin to keep MAP greater than 65        Acute kidney injury (HCC)- (present on admission)  Assessment & Plan  -Suspect multifactorial due to septic shock, found down/rhabdomyolysis  -Avoid nephrotoxins  -Renally dose medications as indicated  -Awaiting arrival of family to discuss plan of care.  Consider nephrology consult    MRSA bacteremia- (present on admission)  Assessment & Plan  -Blood cultures x2 6/28 positive  -Repeat blood cultures today  -Antibiotics per ID recommendation  -MRI C-spine T-spine L-spine today  -Isolation    Rhabdomyolysis  Assessment & Plan  -Unknown downtime.  Suspect 8 to 12 hours  -CPK 4000s, stop trending  -Status post IV fluid resuscitation    Elevated INR  Assessment & Plan  -due to liver failure  -INR stable,  trending down  -monitor for bleeding  -holding off on vitamin K/FFP given stable bleed on imaging    Acute encephalopathy  Assessment & Plan  -toxic-metabolic - multi-factorial due to MRSA bacteremia, intraparenchymal/SAH bleed  -syphilis reactive - RPR reflex to titer pending  -TSH WNL.  Check vitamin B12  -Hepatitis C reactive  -on ABX  -judicious use of narcotics/sedation    Thrombocytopenia (HCC)- (present on admission)  Assessment & Plan  -per chart review, this appears to be chronic with baseline platelet count 110-130's  -monitor bleeding  -follow CBC    Elevated transaminase level  Assessment & Plan  -Suspect shock liver from sepsis and hypotension  -Avoid hepatotoxins  -Hepatitis C reactive -ID following     Chronic prescription benzodiazepine use- (present on admission)  Assessment & Plan  -PDMP reviewed  -Xanax as OP    Elevated troponin- (present on admission)  Assessment & Plan  -Per report from outside facility troponin was around 11,000  -ECHO.  Reviewed with cardiology.  Global hypokinesis.  EF 30%  -Twelve-lead EKG with no ST segment changes  -Not a candidate for ASA therapy  -Telemetry    Acute respiratory failure with hypoxia (HCC)- (present on admission)  Assessment & Plan  -Intubated date: 6/28/22  -Titrate ventilator prescription to maintain acid-base balance, oxygenation and ventilation  -Titrate FiO2 to keep sats > 92%  -ABCDEF bundle  -Pepcid, chlorhexidine, SCD  -HOB > 30    Intraparenchymal hemorrhage of brain (HCC)- (present on admission)  Assessment & Plan  -Multifocal brain hemorrhages with IPH and SAH on imaging  -Neurology following  -s/p platelet transfusion 6/29 for acute on chronic thrombocytopenia  -Serial neurologic exams   -HOB >30  -Maintain normothermia, normovolemia, normoglycemia  -SCDs for DVT prophylaxis        Chronic, continuous use of opioids- (present on admission)  Assessment & Plan  -PDMP reviewed  -Acetaminophen-Cod #3 Tablet as OP - holding due to elevated liver  enzymes  -PRN Fentanyl    Chronic pain syndrome- (present on admission)  Assessment & Plan  -on duloxetine and gabapentin as OP  -Fentanyl IV for pain goal < 2  -PDMP reviewed       VTE:  Contraindicated  Ulcer: H2 Antagonist  Lines: Central Line  Ongoing indication addressed, Arterial Line  Ongoing indication addressed and Lowery Catheter  Ongoing indication addressed    I have performed a physical exam and reviewed and updated ROS and Plan today (6/30/2022). In review of yesterday's note (6/29/2022), there are no changes except as documented above.     Discussed patient condition and risk of morbidity and/or mortality with Family, RN, RT, Pharmacy, , Code status disscussed, Charge nurse / hot rounds, neurology and my attending Dr. Frost  The patient remains critically ill.  Critical care time = 55 minutes in directly providing and coordinating critical care and extensive data review.  No time overlap and excludes procedures.    Please note that this dictation was created using voice recognition software. I have made every reasonable attempt to correct obvious errors, but there may be errors of grammar and possibly content that I did not discover before finalizing the note.    DORETHA Lund.

## 2022-06-30 NOTE — ASSESSMENT & PLAN NOTE
-toxic-metabolic - multi-factorial due to MRSA bacteremia, intraparenchymal/SAH bleed  -syphilis reactive - RPR reflex to titer pending  -TSH WNL.  Check vitamin B12  -Hepatitis C reactive  -on ABX  -judicious use of narcotics/sedation

## 2022-06-30 NOTE — PROGRESS NOTES
Neurology note    Provided medical updates to Ena, daughter at bedside.  I explained that her infection is systemic and has overtaken the entire body- leading to organ failure- brain, heart, lungs, kidneys, liver- and that despite optimal medical interventions, her overall clinical trajectory continues to decline.  I explained given this, along with her age, and poor baseline health status- the ability for her body to recover in a meaningful way is essentially zero- and that additional interventions may prolong the survival of her organs but may cause further pain and suffering.  Ena explained that her mom has told her she would not want to have a protracted hospital course if there is no significant chance for a meaningful return to a quality of life.  As such, she would like to transition her code status to DNR, and to not escalate further care.  She will be calling in family to say goodbye before full transition to comfort measures including extubation.    Jovanni Connelly MD  Neurology

## 2022-07-01 NOTE — PROGRESS NOTES
RCC    Family all arrived.  Decision made to transition to CC.  Patient extubated and pressors discontinued.  Case reviewed.

## 2022-07-01 NOTE — PROGRESS NOTES
Notified by nursing that family has opted to transition to comfort care. Comfort care orders initiated.

## 2022-07-01 NOTE — DISCHARGE SUMMARY
Death Summary    Cause of Death  Multisystem organ failure due to septic shock due to methicillin-resistant Staph aureus bacteremia due to uncertain etiology.    Small right frontal intraparenchymal hemorrhage and bilateral cortically based subarachnoid hemorrhage with extensive white matter disease.    Comorbid Conditions at the Time of Death  Principal Problem:    Septic shock (HCC) POA: Yes  Active Problems:    MRSA bacteremia POA: Yes    Acute kidney injury (HCC) POA: Yes    Chronic pain syndrome POA: Yes    Chronic, continuous use of opioids POA: Yes    Intraparenchymal hemorrhage of brain (HCC) POA: Yes    Acute respiratory failure with hypoxia (HCC) POA: Yes    Elevated troponin POA: Yes    Chronic prescription benzodiazepine use (Chronic) POA: Yes    Elevated transaminase level POA: Unknown    Thrombocytopenia (HCC) POA: Yes    Acute encephalopathy POA: Unknown    Elevated INR POA: Unknown    Rhabdomyolysis POA: Unknown  Resolved Problems:    * No resolved hospital problems. *      History of Presenting Illness and Hospital Course  Candelaria Murphy is a 71 year old female with PMH significant for COPD, anxiety/depression on chronic benzodiazepine therapy, chronic pain on chronic opioid therapy who transferred here from [facility] with intraparenchymal hemorrhage. Per report, patients  returned home from work to find patient down on the floor, obtunded. On arrival to [place], GCS 6, SBP's 80, temp 102.2. Non-con head CT showed small right frontal intraparenchymal hemorrhage and bilateral cortically based SAH with extensive white matter disease. She was intubated, loaded with Keppra, given IVF bolus', and Ceftriaxone.   Patient also noted to have elevated troponin, leukocytosis, and lactic acid 3. CT chest/abdomen/pelvis reportedly without acute pathology.  En route to [facility], patient received sedation with Versed/Fentanyl and became more hypotensive requiring vasopressor support.   Lumbar puncture  performed on arrival to [facility] 88 WBC, 59 protein, (-) HSV/encephalitis panel CSF.  Blood cultures x2 grew MRSA.  ID was consulted and recommended MRI C-spine T-spine L-spine and NICOLAS, however after palliative care consult and family conference patient's , son and daughter all were in agreement to transition to comfort care.  She was extubated evening of  and  peacefully this morning with all of her family at bedside.         Time of Death: 0608    Please note that this dictation was created using voice recognition software. I have made every reasonable attempt to correct obvious errors, but there may be errors of grammar and possibly content that I did not discover before finalizing the note.    DORETHA Lund.

## 2022-07-01 NOTE — CONSULTS
DATE OF SERVICE:  06/30/2022     REQUESTING PHYSICIAN:  Sidney Frost MD     REASON FOR CONSULTATION:  Acute kidney injury.     Unfortunately, the patient is intubated, unresponsive, unable to provide any   history.  Most of the story was through reviewing record, discussing the case   with Dr. Frost.     HISTORY OF PRESENT ILLNESS:  Briefly, the patient is a 71-year-old lady who   presented to an outside hospital after found unresponsive.  The patient was   eventually transferred to [facility], she was diagnosed with   intracranial bleed, severe sepsis, septic shock and acute kidney injury.    Right now, she is intubated, unresponsive, unable to provide any history.     PAST MEDICAL HISTORY:  Unobtainable.  Please refer to the chart.     SOCIAL HISTORY:  Unobtainable.  Please refer to the chart.     FAMILY HISTORY:  Unobtainable.  Please refer to the chart.     MEDICATIONS:  Unobtainable.  Please refer to the chart.     REVIEW OF SYSTEMS:  Unobtainable.  Please refer to the chart.     ALLERGIES:  Unobtainable.  Please refer to the chart.     PHYSICAL EXAMINATION:  GENERAL:  The patient appears ill.  She is unresponsive.  She is intubated.  VITAL SIGNS:  Showed blood pressure of 120/65, heart rate was 82, respiratory   rate was 27.  HEENT:  Normocephalic, atraumatic.  Sclerae are anicteric.  Pupils are   reactive.  Nose normal.  Mucous membranes moist.  The patient has endotracheal   tube.  NECK:  No lymphadenopathy, no JVD, no thyroid mass.  CHEST:  Normal.  LUNGS:  Coarse breath sounds.  HEART:  S1, S2.  ABDOMEN:  Soft, nontender.  No hepatosplenomegaly.  There is no inguinal   lymphadenopathy.  EXTREMITIES:  There is trace lower extremity edema.  SKIN:  No skin rash.  NEUROLOGIC:  The patient is sedated and unresponsive.     DIAGNOSTIC DATA:  Her recent labs from today were reviewed.  The patient also   had an abdominal CT scan done at an outside facility.  It was done without IV   contrast.   I reviewed the image myself, showed no hydronephrosis.     ASSESSMENT:  1.  Acute kidney injury, the etiology is most likely acute tubular necrosis.  2.  Septic shock.  3.  Respiratory failure.  4.  Rhabdomyolysis.  5.  Coagulopathy.  6.  Encephalopathy.     PLAN:  1.  There is no acute need for renal replacement therapy initiate.  The   patient had multi-organ system failure.  2.  Prognosis is very poor.  3.  The patient's daughter is considering transition to comfort care, which I   do believe is appropriate considering the poor prognosis.  4.  Renal diet.  5.  Evaluate daily for the need of dialysis.  6.  Plan discussed in detail with Dr. Frost.        ______________________________  FADI NAJJAR, MD FN/HANDY    DD:  06/30/2022 15:35  DT:  06/30/2022 18:40    Job#:  [id number]

## 2022-07-05 LAB — T PALLIDUM AB SER QL AGGL: NON REACTIVE
